# Patient Record
Sex: FEMALE | Race: BLACK OR AFRICAN AMERICAN | NOT HISPANIC OR LATINO
[De-identification: names, ages, dates, MRNs, and addresses within clinical notes are randomized per-mention and may not be internally consistent; named-entity substitution may affect disease eponyms.]

---

## 2021-03-18 ENCOUNTER — NON-APPOINTMENT (OUTPATIENT)
Age: 41
End: 2021-03-18

## 2021-03-18 ENCOUNTER — APPOINTMENT (OUTPATIENT)
Dept: OBGYN | Facility: CLINIC | Age: 41
End: 2021-03-18
Payer: COMMERCIAL

## 2021-03-18 ENCOUNTER — ASOB RESULT (OUTPATIENT)
Age: 41
End: 2021-03-18

## 2021-03-18 VITALS
SYSTOLIC BLOOD PRESSURE: 122 MMHG | HEIGHT: 62 IN | WEIGHT: 160 LBS | BODY MASS INDEX: 29.44 KG/M2 | DIASTOLIC BLOOD PRESSURE: 80 MMHG

## 2021-03-18 LAB
BILIRUB UR QL STRIP: NORMAL
CLARITY UR: CLEAR
COLLECTION METHOD: NORMAL
GLUCOSE UR-MCNC: NORMAL
HCG UR QL: 0.2 EU/DL
HGB UR QL STRIP.AUTO: NORMAL
KETONES UR-MCNC: NORMAL
LEUKOCYTE ESTERASE UR QL STRIP: NORMAL
NITRITE UR QL STRIP: NORMAL
PH UR STRIP: 6.5
PROT UR STRIP-MCNC: NORMAL
SP GR UR STRIP: 1.01

## 2021-03-18 PROCEDURE — 81003 URINALYSIS AUTO W/O SCOPE: CPT | Mod: NC,QW

## 2021-03-18 PROCEDURE — 99072 ADDL SUPL MATRL&STAF TM PHE: CPT

## 2021-03-18 PROCEDURE — 76801 OB US < 14 WKS SINGLE FETUS: CPT

## 2021-03-18 PROCEDURE — 99385 PREV VISIT NEW AGE 18-39: CPT | Mod: 25

## 2021-03-20 LAB
ABO + RH PNL BLD: NORMAL
ALBUMIN SERPL ELPH-MCNC: 4.6 G/DL
ALP BLD-CCNC: 55 U/L
ALT SERPL-CCNC: 13 U/L
ANION GAP SERPL CALC-SCNC: 17 MMOL/L
AST SERPL-CCNC: 19 U/L
BACTERIA UR CULT: NORMAL
BASOPHILS # BLD AUTO: 0.03 K/UL
BASOPHILS NFR BLD AUTO: 0.3 %
BILIRUB SERPL-MCNC: 0.4 MG/DL
BLD GP AB SCN SERPL QL: NORMAL
BUN SERPL-MCNC: 14 MG/DL
C TRACH RRNA SPEC QL NAA+PROBE: NOT DETECTED
CALCIUM SERPL-MCNC: 9.3 MG/DL
CHLORIDE SERPL-SCNC: 100 MMOL/L
CO2 SERPL-SCNC: 19 MMOL/L
CREAT SERPL-MCNC: 0.78 MG/DL
EOSINOPHIL # BLD AUTO: 0.15 K/UL
EOSINOPHIL NFR BLD AUTO: 1.4 %
GLUCOSE SERPL-MCNC: 43 MG/DL
HBV SURFACE AG SER QL: NONREACTIVE
HCT VFR BLD CALC: 41.7 %
HGB A MFR BLD: 97 %
HGB A2 MFR BLD: 3 %
HGB BLD-MCNC: 13.7 G/DL
HGB FRACT BLD-IMP: NORMAL
HIV1+2 AB SPEC QL IA.RAPID: NONREACTIVE
HPV HIGH+LOW RISK DNA PNL CVX: NOT DETECTED
IMM GRANULOCYTES NFR BLD AUTO: 0.3 %
LEAD BLD-MCNC: <1 UG/DL
LYMPHOCYTES # BLD AUTO: 1.8 K/UL
LYMPHOCYTES NFR BLD AUTO: 16.9 %
MAN DIFF?: NORMAL
MCHC RBC-ENTMCNC: 30.5 PG
MCHC RBC-ENTMCNC: 32.9 GM/DL
MCV RBC AUTO: 92.9 FL
MEV IGG FLD QL IA: 257 AU/ML
MEV IGG+IGM SER-IMP: POSITIVE
MONOCYTES # BLD AUTO: 0.75 K/UL
MONOCYTES NFR BLD AUTO: 7 %
N GONORRHOEA RRNA SPEC QL NAA+PROBE: NOT DETECTED
NEUTROPHILS # BLD AUTO: 7.88 K/UL
NEUTROPHILS NFR BLD AUTO: 74.1 %
PLATELET # BLD AUTO: 230 K/UL
POTASSIUM SERPL-SCNC: 4.4 MMOL/L
PROT SERPL-MCNC: 7.2 G/DL
RBC # BLD: 4.49 M/UL
RBC # FLD: 13.8 %
RUBV IGG FLD-ACNC: 2.3 INDEX
RUBV IGG SER-IMP: POSITIVE
SODIUM SERPL-SCNC: 137 MMOL/L
SOURCE TP AMPLIFICATION: NORMAL
T GONDII AB SER-IMP: NEGATIVE
T GONDII AB SER-IMP: NEGATIVE
T GONDII IGG SER QL: <3 IU/ML
T GONDII IGM SER QL: <3 AU/ML
T PALLIDUM AB SER QL IA: NEGATIVE
T4 FREE SERPL-MCNC: 1.3 NG/DL
VZV AB TITR SER: POSITIVE
VZV IGG SER IF-ACNC: 787.7 INDEX
WBC # FLD AUTO: 10.64 K/UL

## 2021-03-22 LAB
B19V IGG SER QL IA: 6.38 INDEX
B19V IGG+IGM SER-IMP: NORMAL
B19V IGG+IGM SER-IMP: POSITIVE
B19V IGM FLD-ACNC: 0.23 INDEX
B19V IGM SER-ACNC: NEGATIVE
CYTOLOGY CVX/VAG DOC THIN PREP: NORMAL

## 2021-03-26 ENCOUNTER — NON-APPOINTMENT (OUTPATIENT)
Age: 41
End: 2021-03-26

## 2021-03-26 ENCOUNTER — APPOINTMENT (OUTPATIENT)
Dept: OBGYN | Facility: CLINIC | Age: 41
End: 2021-03-26
Payer: COMMERCIAL

## 2021-03-26 ENCOUNTER — RESULT CHARGE (OUTPATIENT)
Age: 41
End: 2021-03-26

## 2021-03-26 VITALS — DIASTOLIC BLOOD PRESSURE: 80 MMHG | SYSTOLIC BLOOD PRESSURE: 120 MMHG | WEIGHT: 160 LBS | BODY MASS INDEX: 29.26 KG/M2

## 2021-03-26 DIAGNOSIS — Z3A.10 10 WEEKS GESTATION OF PREGNANCY: ICD-10-CM

## 2021-03-26 LAB
BILIRUB UR QL STRIP: NORMAL
CLARITY UR: CLEAR
COLLECTION METHOD: NORMAL
GLUCOSE UR-MCNC: NORMAL
HCG UR QL: 0.2 EU/DL
HGB UR QL STRIP.AUTO: NORMAL
KETONES UR-MCNC: NORMAL
LEUKOCYTE ESTERASE UR QL STRIP: NORMAL
NITRITE UR QL STRIP: NORMAL
PH UR STRIP: 7
PROT UR STRIP-MCNC: NORMAL
SP GR UR STRIP: 1.01

## 2021-03-26 PROCEDURE — 81003 URINALYSIS AUTO W/O SCOPE: CPT | Mod: NC,QW

## 2021-03-26 PROCEDURE — 36415 COLL VENOUS BLD VENIPUNCTURE: CPT

## 2021-03-26 PROCEDURE — 99072 ADDL SUPL MATRL&STAF TM PHE: CPT

## 2021-03-26 PROCEDURE — 0502F SUBSEQUENT PRENATAL CARE: CPT

## 2021-04-05 ENCOUNTER — NON-APPOINTMENT (OUTPATIENT)
Age: 41
End: 2021-04-05

## 2021-04-08 ENCOUNTER — OUTPATIENT (OUTPATIENT)
Dept: OUTPATIENT SERVICES | Facility: HOSPITAL | Age: 41
LOS: 1 days | End: 2021-04-08
Payer: COMMERCIAL

## 2021-04-08 ENCOUNTER — APPOINTMENT (OUTPATIENT)
Dept: MAMMOGRAPHY | Facility: IMAGING CENTER | Age: 41
End: 2021-04-08
Payer: COMMERCIAL

## 2021-04-08 ENCOUNTER — APPOINTMENT (OUTPATIENT)
Dept: ULTRASOUND IMAGING | Facility: IMAGING CENTER | Age: 41
End: 2021-04-08
Payer: COMMERCIAL

## 2021-04-08 DIAGNOSIS — Z00.8 ENCOUNTER FOR OTHER GENERAL EXAMINATION: ICD-10-CM

## 2021-04-08 PROCEDURE — 76641 ULTRASOUND BREAST COMPLETE: CPT | Mod: 26,50

## 2021-04-08 PROCEDURE — 77066 DX MAMMO INCL CAD BI: CPT | Mod: 26

## 2021-04-08 PROCEDURE — G0279: CPT

## 2021-04-08 PROCEDURE — 76641 ULTRASOUND BREAST COMPLETE: CPT

## 2021-04-08 PROCEDURE — 77066 DX MAMMO INCL CAD BI: CPT

## 2021-04-08 PROCEDURE — G0279: CPT | Mod: 26

## 2021-04-09 ENCOUNTER — APPOINTMENT (OUTPATIENT)
Dept: OBGYN | Facility: CLINIC | Age: 41
End: 2021-04-09
Payer: COMMERCIAL

## 2021-04-09 ENCOUNTER — NON-APPOINTMENT (OUTPATIENT)
Age: 41
End: 2021-04-09

## 2021-04-09 ENCOUNTER — ASOB RESULT (OUTPATIENT)
Age: 41
End: 2021-04-09

## 2021-04-09 VITALS
SYSTOLIC BLOOD PRESSURE: 110 MMHG | WEIGHT: 161 LBS | DIASTOLIC BLOOD PRESSURE: 84 MMHG | BODY MASS INDEX: 29.63 KG/M2 | HEIGHT: 62 IN

## 2021-04-09 DIAGNOSIS — Z3A.12 12 WEEKS GESTATION OF PREGNANCY: ICD-10-CM

## 2021-04-09 LAB
BILIRUB UR QL STRIP: NORMAL
CLARITY UR: CLEAR
COLLECTION METHOD: NORMAL
GLUCOSE UR-MCNC: NORMAL
HCG UR QL: 0.2 EU/DL
HGB UR QL STRIP.AUTO: NORMAL
KETONES UR-MCNC: NORMAL
LEUKOCYTE ESTERASE UR QL STRIP: NORMAL
NITRITE UR QL STRIP: NORMAL
PH UR STRIP: 6
PROT UR STRIP-MCNC: NORMAL
SP GR UR STRIP: 1.01

## 2021-04-09 PROCEDURE — 81003 URINALYSIS AUTO W/O SCOPE: CPT | Mod: NC,QW

## 2021-04-09 PROCEDURE — 0502F SUBSEQUENT PRENATAL CARE: CPT

## 2021-04-09 PROCEDURE — 76813 OB US NUCHAL MEAS 1 GEST: CPT

## 2021-05-10 ENCOUNTER — NON-APPOINTMENT (OUTPATIENT)
Age: 41
End: 2021-05-10

## 2021-05-10 ENCOUNTER — LABORATORY RESULT (OUTPATIENT)
Age: 41
End: 2021-05-10

## 2021-05-10 ENCOUNTER — APPOINTMENT (OUTPATIENT)
Dept: OBGYN | Facility: CLINIC | Age: 41
End: 2021-05-10
Payer: COMMERCIAL

## 2021-05-10 ENCOUNTER — APPOINTMENT (OUTPATIENT)
Dept: ANTEPARTUM | Facility: CLINIC | Age: 41
End: 2021-05-10
Payer: COMMERCIAL

## 2021-05-10 ENCOUNTER — ASOB RESULT (OUTPATIENT)
Age: 41
End: 2021-05-10

## 2021-05-10 ENCOUNTER — APPOINTMENT (OUTPATIENT)
Dept: ANTEPARTUM | Facility: CLINIC | Age: 41
End: 2021-05-10

## 2021-05-10 VITALS
HEIGHT: 62 IN | BODY MASS INDEX: 30.73 KG/M2 | WEIGHT: 167 LBS | SYSTOLIC BLOOD PRESSURE: 124 MMHG | DIASTOLIC BLOOD PRESSURE: 84 MMHG

## 2021-05-10 DIAGNOSIS — O35.9XX0 MATERNAL CARE FOR (SUSPECTED) FETAL ABNORMALITY AND DAMAGE, UNSPECIFIED, NOT APPLICABLE OR UNSPECIFIED: ICD-10-CM

## 2021-05-10 LAB
BILIRUB UR QL STRIP: NORMAL
CLARITY UR: CLEAR
COLLECTION METHOD: NORMAL
GLUCOSE UR-MCNC: NORMAL
HCG UR QL: 0.2 EU/DL
HGB UR QL STRIP.AUTO: NORMAL
KETONES UR-MCNC: NORMAL
LEUKOCYTE ESTERASE UR QL STRIP: NORMAL
NITRITE UR QL STRIP: NORMAL
PH UR STRIP: 5.5
PROT UR STRIP-MCNC: NORMAL
SP GR UR STRIP: 1.01
T4 FREE SERPL-MCNC: 1.2 NG/DL
TSH SERPL-ACNC: 2.27 UIU/ML
TSH SERPL-ACNC: 3.15 UIU/ML

## 2021-05-10 PROCEDURE — 99203 OFFICE O/P NEW LOW 30 MIN: CPT | Mod: 25

## 2021-05-10 PROCEDURE — 81003 URINALYSIS AUTO W/O SCOPE: CPT | Mod: NC,QW

## 2021-05-10 PROCEDURE — 99072 ADDL SUPL MATRL&STAF TM PHE: CPT

## 2021-05-10 PROCEDURE — 76815 OB US LIMITED FETUS(S): CPT

## 2021-05-10 PROCEDURE — 0502F SUBSEQUENT PRENATAL CARE: CPT

## 2021-05-10 PROCEDURE — 59000 AMNIOCENTESIS DIAGNOSTIC: CPT

## 2021-05-10 PROCEDURE — 36415 COLL VENOUS BLD VENIPUNCTURE: CPT

## 2021-05-10 PROCEDURE — 76946 ECHO GUIDE FOR AMNIOCENTESIS: CPT | Mod: 26

## 2021-05-11 ENCOUNTER — LABORATORY RESULT (OUTPATIENT)
Age: 41
End: 2021-05-11

## 2021-05-11 PROBLEM — O35.9XX0 FETAL ABNORMALITY IN PREGNANCY: Status: ACTIVE | Noted: 2021-05-11

## 2021-05-13 LAB — URINE CYTOLOGY: NORMAL

## 2021-05-14 ENCOUNTER — APPOINTMENT (OUTPATIENT)
Dept: PEDIATRIC CARDIOLOGY | Facility: CLINIC | Age: 41
End: 2021-05-14
Payer: COMMERCIAL

## 2021-05-14 LAB — BACTERIA UR CULT: NORMAL

## 2021-05-14 PROCEDURE — 93325 DOPPLER ECHO COLOR FLOW MAPG: CPT | Mod: 59

## 2021-05-14 PROCEDURE — 99072 ADDL SUPL MATRL&STAF TM PHE: CPT

## 2021-05-14 PROCEDURE — 76820 UMBILICAL ARTERY ECHO: CPT

## 2021-05-14 PROCEDURE — 76827 ECHO EXAM OF FETAL HEART: CPT

## 2021-05-14 PROCEDURE — 99203 OFFICE O/P NEW LOW 30 MIN: CPT | Mod: 25

## 2021-05-14 PROCEDURE — 76825 ECHO EXAM OF FETAL HEART: CPT

## 2021-05-17 ENCOUNTER — TRANSCRIPTION ENCOUNTER (OUTPATIENT)
Age: 41
End: 2021-05-17

## 2021-05-17 LAB
1ST TRIMESTER DATA: NORMAL
1ST TRIMESTER DATA: NORMAL
2ND TRIMESTER DATA: NORMAL
ADDENDUM DOC: NORMAL
AFP PNL SERPL: NORMAL
AFP PNL SERPL: NORMAL
AFP SERPL-ACNC: NORMAL
B-HCG FREE SERPL-MCNC: NORMAL
CLINICAL BIOCHEMIST REVIEW: NORMAL
FREE BETA HCG 1ST TRIMESTER: NORMAL
FREE BETA HCG 1ST TRIMESTER: NORMAL
INHIBIN A SERPL-MCNC: NORMAL
Lab: NORMAL
NASAL BONE: PRESENT
NASAL BONE: PRESENT
NOTES NTD: NORMAL
NOTES NTD: NORMAL
NT: NORMAL
NT: NORMAL
PAPP-A SERPL-ACNC: NORMAL
PAPP-A SERPL-ACNC: NORMAL
TRISOMY 18/3: NORMAL
U ESTRIOL SERPL-SCNC: NORMAL

## 2021-05-19 ENCOUNTER — NON-APPOINTMENT (OUTPATIENT)
Age: 41
End: 2021-05-19

## 2021-05-21 ENCOUNTER — APPOINTMENT (OUTPATIENT)
Dept: PEDIATRIC CARDIOLOGY | Facility: CLINIC | Age: 41
End: 2021-05-21
Payer: COMMERCIAL

## 2021-05-21 PROCEDURE — 99072 ADDL SUPL MATRL&STAF TM PHE: CPT

## 2021-05-21 PROCEDURE — 93325 DOPPLER ECHO COLOR FLOW MAPG: CPT

## 2021-05-21 PROCEDURE — 99203 OFFICE O/P NEW LOW 30 MIN: CPT

## 2021-05-21 PROCEDURE — 76825 ECHO EXAM OF FETAL HEART: CPT

## 2021-05-21 PROCEDURE — 76827 ECHO EXAM OF FETAL HEART: CPT

## 2021-05-24 ENCOUNTER — APPOINTMENT (OUTPATIENT)
Dept: ANTEPARTUM | Facility: CLINIC | Age: 41
End: 2021-05-24

## 2021-05-27 ENCOUNTER — APPOINTMENT (OUTPATIENT)
Dept: ANTEPARTUM | Facility: CLINIC | Age: 41
End: 2021-05-27
Payer: COMMERCIAL

## 2021-05-27 ENCOUNTER — OUTPATIENT (OUTPATIENT)
Dept: OUTPATIENT SERVICES | Facility: HOSPITAL | Age: 41
LOS: 1 days | End: 2021-05-27
Payer: COMMERCIAL

## 2021-05-27 ENCOUNTER — ASOB RESULT (OUTPATIENT)
Age: 41
End: 2021-05-27

## 2021-05-27 DIAGNOSIS — O35.8XX0 MATERNAL CARE FOR OTHER (SUSPECTED) FETAL ABNORMALITY AND DAMAGE, NOT APPLICABLE OR UNSPECIFIED: ICD-10-CM

## 2021-05-27 DIAGNOSIS — O99.282 ENDOCRINE, NUTRITIONAL AND METABOLIC DISEASES COMPLICATING PREGNANCY, SECOND TRIMESTER: ICD-10-CM

## 2021-05-27 DIAGNOSIS — O09.521 SUPERVISION OF ELDERLY MULTIGRAVIDA, FIRST TRIMESTER: ICD-10-CM

## 2021-05-27 DIAGNOSIS — O09.812 SUPERVISION OF PREGNANCY RESULTING FROM ASSISTED REPRODUCTIVE TECHNOLOGY, SECOND TRIMESTER: ICD-10-CM

## 2021-05-27 DIAGNOSIS — O34.12 MATERNAL CARE FOR BENIGN TUMOR OF CORPUS UTERI, SECOND TRIMESTER: ICD-10-CM

## 2021-05-27 DIAGNOSIS — Z01.419 ENCOUNTER FOR GYNECOLOGICAL EXAMINATION (GENERAL) (ROUTINE) WITHOUT ABNORMAL FINDINGS: ICD-10-CM

## 2021-05-27 PROCEDURE — 99072 ADDL SUPL MATRL&STAF TM PHE: CPT

## 2021-05-27 PROCEDURE — 76811 OB US DETAILED SNGL FETUS: CPT

## 2021-05-27 PROCEDURE — 99214 OFFICE O/P EST MOD 30 MIN: CPT | Mod: 25

## 2021-05-27 PROCEDURE — 76946 ECHO GUIDE FOR AMNIOCENTESIS: CPT

## 2021-05-27 PROCEDURE — 88285 CHROMOSOME COUNT ADDITIONAL: CPT

## 2021-05-27 PROCEDURE — 88269 CHROMOSOME ANALYS AMNIOTIC: CPT

## 2021-05-27 PROCEDURE — 88271 CYTOGENETICS DNA PROBE: CPT

## 2021-05-27 PROCEDURE — 59000 AMNIOCENTESIS DIAGNOSTIC: CPT

## 2021-05-27 PROCEDURE — 88280 CHROMOSOME KARYOTYPE STUDY: CPT

## 2021-05-27 PROCEDURE — 88275 CYTOGENETICS 100-300: CPT

## 2021-05-27 PROCEDURE — 88235 TISSUE CULTURE PLACENTA: CPT

## 2021-05-27 PROCEDURE — 76946 ECHO GUIDE FOR AMNIOCENTESIS: CPT | Mod: 26

## 2021-05-28 LAB — SUBTELOMERE ANALYSIS BLD/T FISH-IMP: SIGNIFICANT CHANGE UP

## 2021-06-02 ENCOUNTER — NON-APPOINTMENT (OUTPATIENT)
Age: 41
End: 2021-06-02

## 2021-06-02 ENCOUNTER — APPOINTMENT (OUTPATIENT)
Dept: OBGYN | Facility: CLINIC | Age: 41
End: 2021-06-02
Payer: COMMERCIAL

## 2021-06-02 VITALS
DIASTOLIC BLOOD PRESSURE: 80 MMHG | WEIGHT: 165 LBS | HEIGHT: 62 IN | BODY MASS INDEX: 30.36 KG/M2 | SYSTOLIC BLOOD PRESSURE: 120 MMHG

## 2021-06-02 DIAGNOSIS — Z3A.20 20 WEEKS GESTATION OF PREGNANCY: ICD-10-CM

## 2021-06-02 LAB
BILIRUB UR QL STRIP: NORMAL
CLARITY UR: CLEAR
COLLECTION METHOD: NORMAL
GLUCOSE UR-MCNC: NORMAL
HCG UR QL: 0.2 EU/DL
HGB UR QL STRIP.AUTO: NORMAL
KETONES UR-MCNC: NORMAL
LEUKOCYTE ESTERASE UR QL STRIP: NORMAL
NITRITE UR QL STRIP: NORMAL
PH UR STRIP: 7
PROT UR STRIP-MCNC: NORMAL
SP GR UR STRIP: 1.02

## 2021-06-02 PROCEDURE — 81003 URINALYSIS AUTO W/O SCOPE: CPT | Mod: NC,QW

## 2021-06-02 PROCEDURE — 0502F SUBSEQUENT PRENATAL CARE: CPT

## 2021-06-03 PROBLEM — Z3A.20 20 WEEKS GESTATION OF PREGNANCY: Status: ACTIVE | Noted: 2021-06-03

## 2021-06-04 ENCOUNTER — APPOINTMENT (OUTPATIENT)
Dept: PEDIATRIC CARDIOLOGY | Facility: CLINIC | Age: 41
End: 2021-06-04
Payer: COMMERCIAL

## 2021-06-04 LAB
CHROM ANALY OVERALL INTERP SPEC-IMP: SIGNIFICANT CHANGE UP
NYS PRENATAL DIAGNOSIS FOLLOW-UP QUESTIONNAIRE: SIGNIFICANT CHANGE UP

## 2021-06-04 PROCEDURE — 99072 ADDL SUPL MATRL&STAF TM PHE: CPT

## 2021-06-04 PROCEDURE — 76820 UMBILICAL ARTERY ECHO: CPT

## 2021-06-04 PROCEDURE — 76826 ECHO EXAM OF FETAL HEART: CPT

## 2021-06-04 PROCEDURE — 99214 OFFICE O/P EST MOD 30 MIN: CPT | Mod: 25

## 2021-06-04 PROCEDURE — 76828 ECHO EXAM OF FETAL HEART: CPT

## 2021-06-04 PROCEDURE — 93325 DOPPLER ECHO COLOR FLOW MAPG: CPT | Mod: 59

## 2021-06-11 ENCOUNTER — ASOB RESULT (OUTPATIENT)
Age: 41
End: 2021-06-11

## 2021-06-11 ENCOUNTER — APPOINTMENT (OUTPATIENT)
Dept: ANTEPARTUM | Facility: CLINIC | Age: 41
End: 2021-06-11
Payer: COMMERCIAL

## 2021-06-11 PROCEDURE — 76820 UMBILICAL ARTERY ECHO: CPT

## 2021-06-11 PROCEDURE — 76816 OB US FOLLOW-UP PER FETUS: CPT

## 2021-06-11 PROCEDURE — 99212 OFFICE O/P EST SF 10 MIN: CPT | Mod: 25

## 2021-06-11 PROCEDURE — 99072 ADDL SUPL MATRL&STAF TM PHE: CPT

## 2021-06-18 ENCOUNTER — APPOINTMENT (OUTPATIENT)
Dept: PEDIATRIC CARDIOLOGY | Facility: CLINIC | Age: 41
End: 2021-06-18
Payer: COMMERCIAL

## 2021-06-18 PROCEDURE — 76825 ECHO EXAM OF FETAL HEART: CPT

## 2021-06-18 PROCEDURE — 99072 ADDL SUPL MATRL&STAF TM PHE: CPT

## 2021-06-18 PROCEDURE — 76820 UMBILICAL ARTERY ECHO: CPT

## 2021-06-18 PROCEDURE — 99214 OFFICE O/P EST MOD 30 MIN: CPT

## 2021-06-18 PROCEDURE — 76827 ECHO EXAM OF FETAL HEART: CPT

## 2021-06-18 PROCEDURE — 93325 DOPPLER ECHO COLOR FLOW MAPG: CPT | Mod: 59

## 2021-06-22 ENCOUNTER — NON-APPOINTMENT (OUTPATIENT)
Age: 41
End: 2021-06-22

## 2021-06-24 ENCOUNTER — APPOINTMENT (OUTPATIENT)
Dept: ANTEPARTUM | Facility: CLINIC | Age: 41
End: 2021-06-24
Payer: COMMERCIAL

## 2021-06-24 ENCOUNTER — ASOB RESULT (OUTPATIENT)
Age: 41
End: 2021-06-24

## 2021-06-24 PROCEDURE — 99213 OFFICE O/P EST LOW 20 MIN: CPT | Mod: 25

## 2021-06-24 PROCEDURE — 76816 OB US FOLLOW-UP PER FETUS: CPT

## 2021-06-24 PROCEDURE — 99072 ADDL SUPL MATRL&STAF TM PHE: CPT

## 2021-06-25 ENCOUNTER — NON-APPOINTMENT (OUTPATIENT)
Age: 41
End: 2021-06-25

## 2021-06-30 ENCOUNTER — NON-APPOINTMENT (OUTPATIENT)
Age: 41
End: 2021-06-30

## 2021-06-30 ENCOUNTER — APPOINTMENT (OUTPATIENT)
Dept: OBGYN | Facility: CLINIC | Age: 41
End: 2021-06-30
Payer: COMMERCIAL

## 2021-06-30 VITALS
DIASTOLIC BLOOD PRESSURE: 78 MMHG | HEIGHT: 62 IN | WEIGHT: 173 LBS | SYSTOLIC BLOOD PRESSURE: 116 MMHG | BODY MASS INDEX: 31.83 KG/M2

## 2021-06-30 DIAGNOSIS — Z3A.24 24 WEEKS GESTATION OF PREGNANCY: ICD-10-CM

## 2021-06-30 LAB
T4 FREE SERPL-MCNC: 1.1 NG/DL
TSH SERPL-ACNC: 0.91 UIU/ML

## 2021-06-30 PROCEDURE — 0502F SUBSEQUENT PRENATAL CARE: CPT

## 2021-07-02 ENCOUNTER — NON-APPOINTMENT (OUTPATIENT)
Age: 41
End: 2021-07-02

## 2021-07-06 ENCOUNTER — APPOINTMENT (OUTPATIENT)
Dept: PEDIATRIC CARDIOLOGY | Facility: CLINIC | Age: 41
End: 2021-07-06
Payer: COMMERCIAL

## 2021-07-06 PROCEDURE — 76820 UMBILICAL ARTERY ECHO: CPT

## 2021-07-06 PROCEDURE — 76828 ECHO EXAM OF FETAL HEART: CPT

## 2021-07-06 PROCEDURE — 93325 DOPPLER ECHO COLOR FLOW MAPG: CPT | Mod: 59

## 2021-07-06 PROCEDURE — 99213 OFFICE O/P EST LOW 20 MIN: CPT | Mod: 25

## 2021-07-06 PROCEDURE — 99072 ADDL SUPL MATRL&STAF TM PHE: CPT

## 2021-07-06 PROCEDURE — 76826 ECHO EXAM OF FETAL HEART: CPT

## 2021-07-08 ENCOUNTER — NON-APPOINTMENT (OUTPATIENT)
Age: 41
End: 2021-07-08

## 2021-07-16 ENCOUNTER — APPOINTMENT (OUTPATIENT)
Dept: PEDIATRIC CARDIOLOGY | Facility: CLINIC | Age: 41
End: 2021-07-16
Payer: COMMERCIAL

## 2021-07-16 PROCEDURE — 76828 ECHO EXAM OF FETAL HEART: CPT

## 2021-07-16 PROCEDURE — 93325 DOPPLER ECHO COLOR FLOW MAPG: CPT | Mod: 59

## 2021-07-16 PROCEDURE — 99212 OFFICE O/P EST SF 10 MIN: CPT

## 2021-07-16 PROCEDURE — 76826 ECHO EXAM OF FETAL HEART: CPT

## 2021-07-16 PROCEDURE — 99072 ADDL SUPL MATRL&STAF TM PHE: CPT

## 2021-07-16 PROCEDURE — 76820 UMBILICAL ARTERY ECHO: CPT

## 2021-07-20 ENCOUNTER — NON-APPOINTMENT (OUTPATIENT)
Age: 41
End: 2021-07-20

## 2021-07-20 ENCOUNTER — APPOINTMENT (OUTPATIENT)
Dept: ANTEPARTUM | Facility: CLINIC | Age: 41
End: 2021-07-20
Payer: COMMERCIAL

## 2021-07-20 ENCOUNTER — ASOB RESULT (OUTPATIENT)
Age: 41
End: 2021-07-20

## 2021-07-20 PROCEDURE — 99072 ADDL SUPL MATRL&STAF TM PHE: CPT

## 2021-07-20 PROCEDURE — ZZZZZ: CPT

## 2021-07-20 PROCEDURE — 76816 OB US FOLLOW-UP PER FETUS: CPT

## 2021-07-21 ENCOUNTER — APPOINTMENT (OUTPATIENT)
Dept: ANTEPARTUM | Facility: CLINIC | Age: 41
End: 2021-07-21

## 2021-07-23 ENCOUNTER — NON-APPOINTMENT (OUTPATIENT)
Age: 41
End: 2021-07-23

## 2021-07-27 ENCOUNTER — APPOINTMENT (OUTPATIENT)
Dept: PEDIATRIC CARDIOLOGY | Facility: CLINIC | Age: 41
End: 2021-07-27
Payer: COMMERCIAL

## 2021-07-27 ENCOUNTER — ASOB RESULT (OUTPATIENT)
Age: 41
End: 2021-07-27

## 2021-07-27 ENCOUNTER — APPOINTMENT (OUTPATIENT)
Dept: ANTEPARTUM | Facility: CLINIC | Age: 41
End: 2021-07-27
Payer: COMMERCIAL

## 2021-07-27 PROCEDURE — 93325 DOPPLER ECHO COLOR FLOW MAPG: CPT | Mod: 59

## 2021-07-27 PROCEDURE — 76820 UMBILICAL ARTERY ECHO: CPT

## 2021-07-27 PROCEDURE — 99213 OFFICE O/P EST LOW 20 MIN: CPT | Mod: 25

## 2021-07-27 PROCEDURE — 76828 ECHO EXAM OF FETAL HEART: CPT

## 2021-07-27 PROCEDURE — 99072 ADDL SUPL MATRL&STAF TM PHE: CPT

## 2021-07-27 PROCEDURE — 76826 ECHO EXAM OF FETAL HEART: CPT

## 2021-07-27 PROCEDURE — 76818 FETAL BIOPHYS PROFILE W/NST: CPT

## 2021-07-29 ENCOUNTER — APPOINTMENT (OUTPATIENT)
Dept: OBGYN | Facility: CLINIC | Age: 41
End: 2021-07-29
Payer: COMMERCIAL

## 2021-07-29 DIAGNOSIS — Z00.00 ENCOUNTER FOR GENERAL ADULT MEDICAL EXAMINATION W/OUT ABNORMAL FINDINGS: ICD-10-CM

## 2021-07-29 DIAGNOSIS — Z23 ENCOUNTER FOR IMMUNIZATION: ICD-10-CM

## 2021-07-29 DIAGNOSIS — O99.280 ENDOCRINE, NUTRITIONAL AND METABOLIC DISEASES COMPLICATING PREGNANCY, UNSPECIFIED TRIMESTER: ICD-10-CM

## 2021-07-29 DIAGNOSIS — E03.9 ENDOCRINE, NUTRITIONAL AND METABOLIC DISEASES COMPLICATING PREGNANCY, UNSPECIFIED TRIMESTER: ICD-10-CM

## 2021-07-29 DIAGNOSIS — Q61.2 POLYCYSTIC KIDNEY, ADULT TYPE: ICD-10-CM

## 2021-07-29 DIAGNOSIS — O36.5990 MATERNAL CARE FOR OTHER KNOWN OR SUSPECTED POOR FETAL GROWTH, UNSPECIFIED TRIMESTER, NOT APPLICABLE OR UNSPECIFIED: ICD-10-CM

## 2021-07-29 DIAGNOSIS — Z3A.28 28 WEEKS GESTATION OF PREGNANCY: ICD-10-CM

## 2021-07-29 DIAGNOSIS — O36.8990 MATERNAL CARE FOR OTHER SPECIFIED FETAL PROBLEMS, UNSPECIFIED TRIMESTER, NOT APPLICABLE OR UNSPECIFIED: ICD-10-CM

## 2021-07-29 DIAGNOSIS — O09.519 SUPERVISION OF ELDERLY PRIMIGRAVIDA, UNSPECIFIED TRIMESTER: ICD-10-CM

## 2021-07-29 LAB
BASOPHILS # BLD AUTO: 0.02 K/UL
BASOPHILS NFR BLD AUTO: 0.2 %
EOSINOPHIL # BLD AUTO: 0.08 K/UL
EOSINOPHIL NFR BLD AUTO: 0.8 %
HCT VFR BLD CALC: 36.6 %
HGB BLD-MCNC: 12.6 G/DL
IMM GRANULOCYTES NFR BLD AUTO: 0.8 %
LYMPHOCYTES # BLD AUTO: 1.14 K/UL
LYMPHOCYTES NFR BLD AUTO: 11.3 %
MAN DIFF?: NORMAL
MCHC RBC-ENTMCNC: 31.8 PG
MCHC RBC-ENTMCNC: 34.4 GM/DL
MCV RBC AUTO: 92.4 FL
MONOCYTES # BLD AUTO: 0.85 K/UL
MONOCYTES NFR BLD AUTO: 8.4 %
NEUTROPHILS # BLD AUTO: 7.94 K/UL
NEUTROPHILS NFR BLD AUTO: 78.5 %
PLATELET # BLD AUTO: 175 K/UL
RBC # BLD: 3.96 M/UL
RBC # FLD: 13.2 %
WBC # FLD AUTO: 10.11 K/UL

## 2021-07-29 PROCEDURE — 0502F SUBSEQUENT PRENATAL CARE: CPT

## 2021-07-31 LAB
BLD GP AB SCN SERPL QL: NORMAL
GLUCOSE 1H P 50 G GLC PO SERPL-MCNC: 95 MG/DL
HIV1+2 AB SPEC QL IA.RAPID: NONREACTIVE

## 2021-08-03 ENCOUNTER — NON-APPOINTMENT (OUTPATIENT)
Age: 41
End: 2021-08-03

## 2021-08-05 ENCOUNTER — APPOINTMENT (OUTPATIENT)
Dept: ANTEPARTUM | Facility: CLINIC | Age: 41
End: 2021-08-05
Payer: COMMERCIAL

## 2021-08-05 ENCOUNTER — ASOB RESULT (OUTPATIENT)
Age: 41
End: 2021-08-05

## 2021-08-05 PROCEDURE — 76818 FETAL BIOPHYS PROFILE W/NST: CPT

## 2021-08-05 PROCEDURE — 76820 UMBILICAL ARTERY ECHO: CPT

## 2021-08-10 ENCOUNTER — APPOINTMENT (OUTPATIENT)
Dept: ANTEPARTUM | Facility: CLINIC | Age: 41
End: 2021-08-10
Payer: COMMERCIAL

## 2021-08-10 ENCOUNTER — ASOB RESULT (OUTPATIENT)
Age: 41
End: 2021-08-10

## 2021-08-10 PROCEDURE — 76816 OB US FOLLOW-UP PER FETUS: CPT

## 2021-08-10 PROCEDURE — 76820 UMBILICAL ARTERY ECHO: CPT

## 2021-08-13 ENCOUNTER — APPOINTMENT (OUTPATIENT)
Dept: PEDIATRIC CARDIOLOGY | Facility: CLINIC | Age: 41
End: 2021-08-13
Payer: COMMERCIAL

## 2021-08-13 PROCEDURE — 99214 OFFICE O/P EST MOD 30 MIN: CPT | Mod: 25

## 2021-08-13 PROCEDURE — 93325 DOPPLER ECHO COLOR FLOW MAPG: CPT | Mod: 59

## 2021-08-13 PROCEDURE — 76828 ECHO EXAM OF FETAL HEART: CPT

## 2021-08-13 PROCEDURE — 76826 ECHO EXAM OF FETAL HEART: CPT

## 2021-08-13 PROCEDURE — 76820 UMBILICAL ARTERY ECHO: CPT

## 2021-08-17 ENCOUNTER — ASOB RESULT (OUTPATIENT)
Age: 41
End: 2021-08-17

## 2021-08-17 ENCOUNTER — APPOINTMENT (OUTPATIENT)
Dept: ANTEPARTUM | Facility: CLINIC | Age: 41
End: 2021-08-17
Payer: COMMERCIAL

## 2021-08-17 ENCOUNTER — APPOINTMENT (OUTPATIENT)
Dept: ANTEPARTUM | Facility: CLINIC | Age: 41
End: 2021-08-17

## 2021-08-17 PROCEDURE — 76818 FETAL BIOPHYS PROFILE W/NST: CPT

## 2021-08-17 PROCEDURE — 76820 UMBILICAL ARTERY ECHO: CPT

## 2021-08-24 ENCOUNTER — APPOINTMENT (OUTPATIENT)
Dept: PEDIATRIC CARDIOLOGY | Facility: CLINIC | Age: 41
End: 2021-08-24

## 2021-08-24 ENCOUNTER — APPOINTMENT (OUTPATIENT)
Dept: ANTEPARTUM | Facility: CLINIC | Age: 41
End: 2021-08-24

## 2021-08-24 ENCOUNTER — ASOB RESULT (OUTPATIENT)
Age: 41
End: 2021-08-24

## 2021-08-24 ENCOUNTER — APPOINTMENT (OUTPATIENT)
Dept: ANTEPARTUM | Facility: CLINIC | Age: 41
End: 2021-08-24
Payer: COMMERCIAL

## 2021-08-24 PROCEDURE — 76819 FETAL BIOPHYS PROFIL W/O NST: CPT

## 2021-08-24 PROCEDURE — 76820 UMBILICAL ARTERY ECHO: CPT

## 2021-08-26 ENCOUNTER — APPOINTMENT (OUTPATIENT)
Dept: OBGYN | Facility: CLINIC | Age: 41
End: 2021-08-26
Payer: COMMERCIAL

## 2021-08-26 VITALS
SYSTOLIC BLOOD PRESSURE: 140 MMHG | WEIGHT: 188 LBS | HEIGHT: 62 IN | DIASTOLIC BLOOD PRESSURE: 100 MMHG | BODY MASS INDEX: 34.6 KG/M2

## 2021-08-26 DIAGNOSIS — Z3A.32 32 WEEKS GESTATION OF PREGNANCY: ICD-10-CM

## 2021-08-26 DIAGNOSIS — O35.9XX0 MATERNAL CARE FOR (SUSPECTED) FETAL ABNORMALITY AND DAMAGE, UNSPECIFIED, NOT APPLICABLE OR UNSPECIFIED: ICD-10-CM

## 2021-08-26 DIAGNOSIS — Z98.890 OTHER SPECIFIED POSTPROCEDURAL STATES: ICD-10-CM

## 2021-08-26 PROCEDURE — 0502F SUBSEQUENT PRENATAL CARE: CPT

## 2021-08-26 PROCEDURE — 36415 COLL VENOUS BLD VENIPUNCTURE: CPT

## 2021-08-27 ENCOUNTER — NON-APPOINTMENT (OUTPATIENT)
Age: 41
End: 2021-08-27

## 2021-08-27 LAB
ALBUMIN SERPL ELPH-MCNC: 3.5 G/DL
ALP BLD-CCNC: 101 U/L
ALT SERPL-CCNC: 16 U/L
ANION GAP SERPL CALC-SCNC: 15 MMOL/L
AST SERPL-CCNC: 17 U/L
BASOPHILS # BLD AUTO: 0.02 K/UL
BASOPHILS NFR BLD AUTO: 0.2 %
BILIRUB SERPL-MCNC: 0.2 MG/DL
BUN SERPL-MCNC: 19 MG/DL
CALCIUM SERPL-MCNC: 8.9 MG/DL
CHLORIDE SERPL-SCNC: 101 MMOL/L
CO2 SERPL-SCNC: 19 MMOL/L
CREAT SERPL-MCNC: 0.85 MG/DL
EOSINOPHIL # BLD AUTO: 0.11 K/UL
EOSINOPHIL NFR BLD AUTO: 1.2 %
GLUCOSE SERPL-MCNC: 71 MG/DL
HCT VFR BLD CALC: 36.8 %
HGB BLD-MCNC: 12.3 G/DL
IMM GRANULOCYTES NFR BLD AUTO: 0.8 %
LYMPHOCYTES # BLD AUTO: 1.47 K/UL
LYMPHOCYTES NFR BLD AUTO: 15.5 %
MAN DIFF?: NORMAL
MCHC RBC-ENTMCNC: 31.3 PG
MCHC RBC-ENTMCNC: 33.4 GM/DL
MCV RBC AUTO: 93.6 FL
MONOCYTES # BLD AUTO: 0.89 K/UL
MONOCYTES NFR BLD AUTO: 9.4 %
NEUTROPHILS # BLD AUTO: 6.91 K/UL
NEUTROPHILS NFR BLD AUTO: 72.9 %
PLATELET # BLD AUTO: 158 K/UL
POTASSIUM SERPL-SCNC: 4.3 MMOL/L
PROT SERPL-MCNC: 5.6 G/DL
RBC # BLD: 3.93 M/UL
RBC # FLD: 13.2 %
SODIUM SERPL-SCNC: 135 MMOL/L
URATE SERPL-MCNC: 5.1 MG/DL
WBC # FLD AUTO: 9.48 K/UL

## 2021-08-30 ENCOUNTER — NON-APPOINTMENT (OUTPATIENT)
Age: 41
End: 2021-08-30

## 2021-09-01 ENCOUNTER — ASOB RESULT (OUTPATIENT)
Age: 41
End: 2021-09-01

## 2021-09-01 ENCOUNTER — INPATIENT (INPATIENT)
Facility: HOSPITAL | Age: 41
LOS: 6 days | Discharge: HOME | End: 2021-09-08
Attending: SPECIALIST | Admitting: SPECIALIST
Payer: COMMERCIAL

## 2021-09-01 ENCOUNTER — NON-APPOINTMENT (OUTPATIENT)
Age: 41
End: 2021-09-01

## 2021-09-01 ENCOUNTER — APPOINTMENT (OUTPATIENT)
Dept: ANTEPARTUM | Facility: CLINIC | Age: 41
End: 2021-09-01

## 2021-09-01 ENCOUNTER — APPOINTMENT (OUTPATIENT)
Dept: ANTEPARTUM | Facility: CLINIC | Age: 41
End: 2021-09-01
Payer: COMMERCIAL

## 2021-09-01 VITALS
RESPIRATION RATE: 17 BRPM | SYSTOLIC BLOOD PRESSURE: 154 MMHG | TEMPERATURE: 99 F | HEART RATE: 67 BPM | DIASTOLIC BLOOD PRESSURE: 96 MMHG

## 2021-09-01 DIAGNOSIS — Z3A.00 WEEKS OF GESTATION OF PREGNANCY NOT SPECIFIED: ICD-10-CM

## 2021-09-01 DIAGNOSIS — O16.3 UNSPECIFIED MATERNAL HYPERTENSION, THIRD TRIMESTER: ICD-10-CM

## 2021-09-01 DIAGNOSIS — Z98.890 OTHER SPECIFIED POSTPROCEDURAL STATES: Chronic | ICD-10-CM

## 2021-09-01 DIAGNOSIS — O26.899 OTHER SPECIFIED PREGNANCY RELATED CONDITIONS, UNSPECIFIED TRIMESTER: ICD-10-CM

## 2021-09-01 LAB
ALBUMIN SERPL ELPH-MCNC: 4 G/DL — SIGNIFICANT CHANGE UP (ref 3.3–5)
ALP SERPL-CCNC: 100 U/L — SIGNIFICANT CHANGE UP (ref 40–120)
ALT FLD-CCNC: 17 U/L — SIGNIFICANT CHANGE UP (ref 4–33)
ANION GAP SERPL CALC-SCNC: 14 MMOL/L — SIGNIFICANT CHANGE UP (ref 7–14)
APPEARANCE UR: CLEAR — SIGNIFICANT CHANGE UP
APTT BLD: 26.8 SEC — LOW (ref 27–36.3)
AST SERPL-CCNC: 16 U/L — SIGNIFICANT CHANGE UP (ref 4–32)
BACTERIA # UR AUTO: NEGATIVE — SIGNIFICANT CHANGE UP
BASOPHILS # BLD AUTO: 0.02 K/UL — SIGNIFICANT CHANGE UP (ref 0–0.2)
BASOPHILS NFR BLD AUTO: 0.2 % — SIGNIFICANT CHANGE UP (ref 0–2)
BILIRUB SERPL-MCNC: 0.3 MG/DL — SIGNIFICANT CHANGE UP (ref 0.2–1.2)
BILIRUB UR-MCNC: NEGATIVE — SIGNIFICANT CHANGE UP
BLD GP AB SCN SERPL QL: NEGATIVE — SIGNIFICANT CHANGE UP
BUN SERPL-MCNC: 13 MG/DL — SIGNIFICANT CHANGE UP (ref 7–23)
CALCIUM SERPL-MCNC: 8.9 MG/DL — SIGNIFICANT CHANGE UP (ref 8.4–10.5)
CHLORIDE SERPL-SCNC: 103 MMOL/L — SIGNIFICANT CHANGE UP (ref 98–107)
CO2 SERPL-SCNC: 19 MMOL/L — LOW (ref 22–31)
COLOR SPEC: YELLOW — SIGNIFICANT CHANGE UP
CREAT ?TM UR-MCNC: 156 MG/DL — SIGNIFICANT CHANGE UP
CREAT SERPL-MCNC: 0.8 MG/DL — SIGNIFICANT CHANGE UP (ref 0.5–1.3)
DIFF PNL FLD: ABNORMAL
EOSINOPHIL # BLD AUTO: 0.1 K/UL — SIGNIFICANT CHANGE UP (ref 0–0.5)
EOSINOPHIL NFR BLD AUTO: 1 % — SIGNIFICANT CHANGE UP (ref 0–6)
EPI CELLS # UR: 1 /HPF — SIGNIFICANT CHANGE UP (ref 0–5)
FIBRINOGEN PPP-MCNC: 610 MG/DL — HIGH (ref 290–520)
GLUCOSE SERPL-MCNC: 98 MG/DL — SIGNIFICANT CHANGE UP (ref 70–99)
GLUCOSE UR QL: NEGATIVE — SIGNIFICANT CHANGE UP
HCT VFR BLD CALC: 35.7 % — SIGNIFICANT CHANGE UP (ref 34.5–45)
HGB BLD-MCNC: 12.4 G/DL — SIGNIFICANT CHANGE UP (ref 11.5–15.5)
HYALINE CASTS # UR AUTO: 2 /LPF — SIGNIFICANT CHANGE UP (ref 0–7)
IANC: 7.94 K/UL — SIGNIFICANT CHANGE UP (ref 1.5–8.5)
IMM GRANULOCYTES NFR BLD AUTO: 0.7 % — SIGNIFICANT CHANGE UP (ref 0–1.5)
INR BLD: 0.95 RATIO — SIGNIFICANT CHANGE UP (ref 0.88–1.16)
KETONES UR-MCNC: NEGATIVE — SIGNIFICANT CHANGE UP
LDH SERPL L TO P-CCNC: 179 U/L — SIGNIFICANT CHANGE UP (ref 135–225)
LEUKOCYTE ESTERASE UR-ACNC: NEGATIVE — SIGNIFICANT CHANGE UP
LYMPHOCYTES # BLD AUTO: 1.37 K/UL — SIGNIFICANT CHANGE UP (ref 1–3.3)
LYMPHOCYTES # BLD AUTO: 13.2 % — SIGNIFICANT CHANGE UP (ref 13–44)
MCHC RBC-ENTMCNC: 31.4 PG — SIGNIFICANT CHANGE UP (ref 27–34)
MCHC RBC-ENTMCNC: 34.7 GM/DL — SIGNIFICANT CHANGE UP (ref 32–36)
MCV RBC AUTO: 90.4 FL — SIGNIFICANT CHANGE UP (ref 80–100)
MONOCYTES # BLD AUTO: 0.84 K/UL — SIGNIFICANT CHANGE UP (ref 0–0.9)
MONOCYTES NFR BLD AUTO: 8.1 % — SIGNIFICANT CHANGE UP (ref 2–14)
NEUTROPHILS # BLD AUTO: 7.94 K/UL — HIGH (ref 1.8–7.4)
NEUTROPHILS NFR BLD AUTO: 76.8 % — SIGNIFICANT CHANGE UP (ref 43–77)
NITRITE UR-MCNC: NEGATIVE — SIGNIFICANT CHANGE UP
NRBC # BLD: 0 /100 WBCS — SIGNIFICANT CHANGE UP
NRBC # FLD: 0 K/UL — SIGNIFICANT CHANGE UP
PH UR: 6 — SIGNIFICANT CHANGE UP (ref 5–8)
PLATELET # BLD AUTO: 177 K/UL — SIGNIFICANT CHANGE UP (ref 150–400)
POTASSIUM SERPL-MCNC: 3.8 MMOL/L — SIGNIFICANT CHANGE UP (ref 3.5–5.3)
POTASSIUM SERPL-SCNC: 3.8 MMOL/L — SIGNIFICANT CHANGE UP (ref 3.5–5.3)
PROT ?TM UR-MCNC: 28 MG/DL — SIGNIFICANT CHANGE UP
PROT ?TM UR-MCNC: 28 MG/DL — SIGNIFICANT CHANGE UP
PROT SERPL-MCNC: 6.7 G/DL — SIGNIFICANT CHANGE UP (ref 6–8.3)
PROT UR-MCNC: ABNORMAL
PROT/CREAT UR-RTO: 0.2 RATIO — SIGNIFICANT CHANGE UP (ref 0–0.2)
PROTHROM AB SERPL-ACNC: 10.9 SEC — SIGNIFICANT CHANGE UP (ref 10.6–13.6)
RBC # BLD: 3.95 M/UL — SIGNIFICANT CHANGE UP (ref 3.8–5.2)
RBC # FLD: 13.3 % — SIGNIFICANT CHANGE UP (ref 10.3–14.5)
RBC CASTS # UR COMP ASSIST: 2 /HPF — SIGNIFICANT CHANGE UP (ref 0–4)
RH IG SCN BLD-IMP: POSITIVE — SIGNIFICANT CHANGE UP
RH IG SCN BLD-IMP: POSITIVE — SIGNIFICANT CHANGE UP
SARS-COV-2 RNA SPEC QL NAA+PROBE: SIGNIFICANT CHANGE UP
SODIUM SERPL-SCNC: 136 MMOL/L — SIGNIFICANT CHANGE UP (ref 135–145)
SP GR SPEC: 1.02 — SIGNIFICANT CHANGE UP (ref 1–1.05)
URATE SERPL-MCNC: 6 MG/DL — SIGNIFICANT CHANGE UP (ref 2.5–7)
UROBILINOGEN FLD QL: SIGNIFICANT CHANGE UP
WBC # BLD: 10.34 K/UL — SIGNIFICANT CHANGE UP (ref 3.8–10.5)
WBC # FLD AUTO: 10.34 K/UL — SIGNIFICANT CHANGE UP (ref 3.8–10.5)
WBC UR QL: 3 /HPF — SIGNIFICANT CHANGE UP (ref 0–5)

## 2021-09-01 PROCEDURE — 76816 OB US FOLLOW-UP PER FETUS: CPT

## 2021-09-01 PROCEDURE — 76818 FETAL BIOPHYS PROFILE W/NST: CPT

## 2021-09-01 PROCEDURE — 76820 UMBILICAL ARTERY ECHO: CPT

## 2021-09-01 PROCEDURE — 99212 OFFICE O/P EST SF 10 MIN: CPT | Mod: 25

## 2021-09-01 PROCEDURE — 99221 1ST HOSP IP/OBS SF/LOW 40: CPT

## 2021-09-01 RX ORDER — MAGNESIUM SULFATE 500 MG/ML
2 VIAL (ML) INJECTION
Qty: 40 | Refills: 0 | Status: DISCONTINUED | OUTPATIENT
Start: 2021-09-01 | End: 2021-09-02

## 2021-09-01 RX ORDER — MAGNESIUM SULFATE 500 MG/ML
4 VIAL (ML) INJECTION ONCE
Refills: 0 | Status: COMPLETED | OUTPATIENT
Start: 2021-09-01 | End: 2021-09-01

## 2021-09-01 RX ORDER — CETIRIZINE HYDROCHLORIDE 10 MG/1
0 TABLET ORAL
Qty: 0 | Refills: 0 | DISCHARGE

## 2021-09-01 RX ORDER — ACETAMINOPHEN 500 MG
975 TABLET ORAL ONCE
Refills: 0 | Status: COMPLETED | OUTPATIENT
Start: 2021-09-01 | End: 2021-09-01

## 2021-09-01 RX ORDER — MOMETASONE FUROATE 220 UG/1
2 INHALANT RESPIRATORY (INHALATION)
Refills: 0 | Status: DISCONTINUED | OUTPATIENT
Start: 2021-09-01 | End: 2021-09-02

## 2021-09-01 RX ORDER — NIFEDIPINE 30 MG
10 TABLET, EXTENDED RELEASE 24 HR ORAL ONCE
Refills: 0 | Status: COMPLETED | OUTPATIENT
Start: 2021-09-01 | End: 2021-09-01

## 2021-09-01 RX ORDER — LABETALOL HCL 100 MG
20 TABLET ORAL ONCE
Refills: 0 | Status: DISCONTINUED | OUTPATIENT
Start: 2021-09-01 | End: 2021-09-01

## 2021-09-01 RX ADMIN — Medication 975 MILLIGRAM(S): at 22:45

## 2021-09-01 RX ADMIN — Medication 50 GM/HR: at 18:00

## 2021-09-01 RX ADMIN — Medication 300 GRAM(S): at 17:38

## 2021-09-01 RX ADMIN — Medication 10 MILLIGRAM(S): at 16:40

## 2021-09-01 RX ADMIN — Medication 975 MILLIGRAM(S): at 21:45

## 2021-09-01 NOTE — OB PROVIDER H&P - HISTORY OF PRESENT ILLNESS
40yo  @ 33.1 presents sent from [facility] for evaluation. EFW today found to be in 1st percentile with elevated dopplers and /90x 2. Denies HA, N/V, epigastric pain, visual changes. Reports she had one other elevated BP in the office on  with normal HELLP labs. Patient for 36 week primary  for previous myomectomy. Gets care with OB from [facility] but will be delivered here due to fetal cardiac alert.   Fully vaccinated for COVID-19    H/O Hypothyroid- Synthroid 100mcg  Asthma- Pulmacort started 2 months ago due to coughing in the morning. Last asthma attack 4 years ago.  2019 Myomectomy-         Reports she got fentanyl and dilauded very close together and coded likely due to too many narcotics, had anesthesia consult here in anticipation of her .   Polycystic Kidneys- followed by Dr. Romero at [place]- gets renal US every 6 months.   Depression- therapy years ago

## 2021-09-01 NOTE — OB PROVIDER TRIAGE NOTE - NS_OBGYNHISTORY_OBGYN_ALL_OB_FT
denies AP course complicated by:  IUGR- in first percentile on US today    FETAL ALERT  Small to moderate sized pericardial effusion (asymmetrical).  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  Mild right ventricular hypertrophy.   peds cardiology evaluation recommended in the NICU within 12-24 hrs after birth or sooner if there is an urgent clinical concern.  Normal karyotype and array. SGA at 4th percentile.  -Hazel Nair, RNC  GHTN     [facility] US today:  BPP , anterior placenta, YADIRA 15.4, EFW 1567g 1st %.

## 2021-09-01 NOTE — OB PROVIDER TRIAGE NOTE - NSHPPHYSICALEXAM_GEN_ALL_CORE
Assessment reveals VSS, abdomen soft, NT, gravid.   Cat 1 FHT, no ctx on toco    HELLP labs sent. Assessment reveals VSS, abdomen soft, NT, gravid.   BP range 149/97- 154/96 pulse 61-75  +1 DTR,   No pheripheral edema  Lungs CTA bilaterally  Normal S1, S2 RRR  Cat 1 FHT, no ctx on toco    HELLP labs sent.    HELLP labs WNL  PCR 0.2    D/W Dr. Marquez  [facility] consult called    1605: Dr. Contreras and Dr. Golden in to evaluate patient. Assessment reveals VSS, abdomen soft, NT, gravid.   BP range 149/97- 154/96 pulse 61-75  +1 DTR,   No pheripheral edema  Lungs CTA bilaterally  Normal S1, S2 RRR  Cat 1 FHT, no ctx on toco    HELLP labs sent.    HELLP labs WNL  PCR 0.2    D/W Dr. Marquez  [facility] consult called    1605: Dr. Contreras and Dr. Golden in to evaluate patient.    1637: Procardia 10mg IR given for 2 severe range BP's.

## 2021-09-01 NOTE — OB PROVIDER H&P - ASSESSMENT
Admit for observation r/o PEC  24 hour urine collection  Betamethasone  D/W Dr. Marquez Admit for observation r/o PEC  24 hour urine collection  Betamethasone  COVID-19 PCR  Repeat labs in AM  D/W Dr. Marquez

## 2021-09-01 NOTE — OB PROVIDER H&P - NS_OBGYNHISTORY_OBGYN_ALL_OB_FT
AP course complicated by:  IUGR- in first percentile on US today    FETAL ALERT  Small to moderate sized pericardial effusion (asymmetrical).  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  Mild right ventricular hypertrophy.   peds cardiology evaluation recommended in the NICU within 12-24 hrs after birth or sooner if there is an urgent clinical concern.  Normal karyotype and array. SGA at 4th percentile.  -Hazel Nair, RNC  GHTN     [facility] US today:  BPP , anterior placenta, YADIRA 15.4, EFW 1567g 1st %.

## 2021-09-01 NOTE — OB PROVIDER TRIAGE NOTE - HISTORY OF PRESENT ILLNESS
40yo  @ 33.1 presents sent from [facility] for evaluation. EFW today found to be in 1st percentile with elevated dopplers and /90x 2. Denies HA, N/V, epigastric pain, visual changes. Reports she had one other elevated BP in the office on  with normal HELLP labs. Patient for 36 week primary  for previous myomectomy. Gets care with OB from [facility] but will be delivered here due to fetal cardiac alert.   Fully vaccinated for COVID-19    H/O Hypothyroid- Synthroid 100mcg  Asthma- Pulmacort  2019 Myomectomy  Polycystic Kidneys- followed by Dr. Romero at [place]- gets renal US every 6 months.   Depression- therapy years ago 40yo  @ 33.1 presents sent from [facility] for evaluation. EFW today found to be in 1st percentile with elevated dopplers and /90x 2. Denies HA, N/V, epigastric pain, visual changes. Reports she had one other elevated BP in the office on  with normal HELLP labs. Patient for 36 week primary  for previous myomectomy. Gets care with OB from [facility] but will be delivered here due to fetal cardiac alert.   Fully vaccinated for COVID-19    H/O Hypothyroid- Synthroid 100mcg  Asthma- Pulmacort started 2 months ago due to coughing in the morning. Last asthma attack 4 years ago.  2019 Myomectomy-         Reports she got fentanyl and dilauded very close together and coded likely due to too many narcotics, had anesthesia consult here in anticipation of her .   Polycystic Kidneys- followed by Dr. Romero at [place]- gets renal US every 6 months.   Depression- therapy years ago

## 2021-09-01 NOTE — PROGRESS NOTE ADULT - SUBJECTIVE AND OBJECTIVE BOX
MFM Consult Note     40yo  at 33w1d with PCKD, asthma and h/o myomectomy as well as newly diagnosed fetal IUGR 1%ile, elevated S/D ratio on umbilical artery dopplers and stable known pericardial effusion, presenting with elevated BP in the ATU. Patient denies any headache, vision changes, RUQ pain. She does endorse having new stressors in her life and history of elevated BP in the past when she was under extreme stress. Patient reports normal fetal movement, denies contractions, LOF or vaginal bleeding.     Vital Signs Last 24 Hrs  T(C): 37.2 (01 Sep 2021 14:30), Max: 37.2 (01 Sep 2021 13:58)  T(F): 98.96 (01 Sep 2021 14:30), Max: 99 (01 Sep 2021 13:58)  HR: 64 (01 Sep 2021 16:23) (60 - 75)  BP: 153/97 (01 Sep 2021 16:23) (142/98 - 172/99)  BP(mean): --  RR: 17 (01 Sep 2021 13:58) (17 - 17)  SpO2: --                          12.4   10.34 )-----------( 177      ( 01 Sep 2021 15:09 )             35.7   09-    136  |  103  |  13  ----------------------------<  98  3.8   |  19<L>  |  0.80    Ca    8.9      01 Sep 2021 15:09    TPro  6.7  /  Alb  4.0  /  TBili  0.3  /  DBili  x   /  AST  16  /  ALT  17  /  AlkPhos  100     MFM Consult Note     40yo  at 33w1d with PCKD, asthma and h/o myomectomy as well as newly diagnosed fetal IUGR 1%ile, elevated S/D ratio on umbilical artery dopplers and stable known pericardial effusion, presenting with elevated BP in the ATU. Patient denies any headache, vision changes, RUQ pain. She does endorse having new stressors in her life and history of elevated BP in the past when she was under extreme stress. Patient reports normal fetal movement, denies contractions, LOF or vaginal bleeding.     Vital Signs Last 24 Hrs  T(C): 37.2 (01 Sep 2021 14:30), Max: 37.2 (01 Sep 2021 13:58)  T(F): 98.96 (01 Sep 2021 14:30), Max: 99 (01 Sep 2021 13:58)  HR: 64 (01 Sep 2021 16:23) (60 - 75)  BP: 153/97 (01 Sep 2021 16:23) (142/98 - 172/99)  BP(mean): --  RR: 17 (01 Sep 2021 13:58) (17 - 17)  SpO2: --                          12.4   10.34 )-----------( 177      ( 01 Sep 2021 15:09 )             35.7   09-01    136  |  103  |  13  ----------------------------<  98  3.8   |  19<L>  |  0.80    Ca    8.9      01 Sep 2021 15:09    TPro  6.7  /  Alb  4.0  /  TBili  0.3  /  DBili  x   /  AST  16  /  ALT  17  /  AlkPhos  100  09-01    EFM: baseline 125, moderate variability, -decels, +accels   [place]: intermittent rare contractions

## 2021-09-01 NOTE — OB RN PATIENT PROFILE - NS_OBGYNHISTORY_OBGYN_ALL_OB_FT
AP course complicated by:  IUGR- in first percentile on US today    FETAL ALERT  Small to moderate sized pericardial effusion (asymmetrical).  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  Mild right ventricular hypertrophy.   peds cardiology evaluation recommended in the NICU within 12-24 hrs after birth or sooner if there is an urgent clinical concern.  Normal karyotype and array. SGA at 4th percentile.  -Hazel Nair, RNC  GHTN     [facility] US today:  BPP , anterior placenta, YADIRA 15.4, EFW 1567g 1st %.

## 2021-09-01 NOTE — OB RN TRIAGE NOTE - NSMATERNALFETALCONCERNS_OBGYN_ALL_OB_FT
FETAL ALERT  Small to moderate sized pericardial effusion (asymmetrical).  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  Mild right ventricular hypertrophy.   peds cardiology evaluation recommended in the NICU within 12-24 hrs after birth or sooner if there is an urgent clinical concern.  Normal karyotype and array. SGA at 4th percentile.  -Hazel Nair, RNC

## 2021-09-01 NOTE — OB PROVIDER H&P - NSHPPHYSICALEXAM_GEN_ALL_CORE
Assessment reveals VSS, abdomen soft, NT, gravid.   BP range 149/97- 154/96 pulse 61-75  +1 DTR,   No pheripheral edema  Lungs CTA bilaterally  Normal S1, S2 RRR  Cat 1 FHT, no ctx on toco    HELLP labs sent.    HELLP labs WNL  PCR 0.2    D/W Dr. Marquez  [facility] consult called    1605: Dr. Contreras and Dr. Golden in to evaluate patient.    1637: Procardia 10mg IR given for 2 severe range BP's.

## 2021-09-01 NOTE — OB PROVIDER H&P - ATTENDING COMMENTS
40yo  @ 33.1 EFW today 1st percentile with elevated dopplers and /90x 2. Denies HA, N/V, epigastric pain, visual changes. Needs delivery at [facility] due to the above fetal cardiac finding above.   +FM, no LOF, no VB, no contractions felt    SurgHx: laparoscopic myomectomy   ObHx:   Gyn: fibroids  Med: PCKD, hypothyroid, asthma, history of depression  NKDA, She received, fentanyl and dilauded very close together and had a cardiopulmonary arrest in the past.  She has had anesthesia consult at [facility].   Social: denies alcohol, drugs, smoking    Vital Signs Last 24 Hrs  T(C): 36.8 (01 Sep 2021 21:15), Max: 37.2 (01 Sep 2021 13:58)  T(F): 98.24 (01 Sep 2021 21:15), Max: 99 (01 Sep 2021 13:58)  HR: 85 (01 Sep 2021 22:57) (60 - 148)  BP: 132/70 (01 Sep 2021 22:51) (132/70 - 173/86)  BP(mean): --  RR: 18 (01 Sep 2021 16:58) (17 - 18)  SpO2: 96% (01 Sep 2021 22:39) (95% - 98%)  I&O's Summary  01 Sep 2021 07:01  -  01 Sep 2021 23:01  --------------------------------------------------------  IN: 750 mL / OUT: 1200 mL / NET: -450 mL                    12.4   10.34 )-----------( 177      ( 01 Sep 2021 15:09 )             35.7     MEDICATIONS  (STANDING):  betamethasone Injectable 12 milliGRAM(s) IntraMuscular every 24 hours  influenza   Vaccine 0.5 milliLiter(s) IntraMuscular once  lactated ringers. 1000 milliLiter(s) (50 mL/Hr) IV Continuous <Continuous>  levothyroxine 100 MICROGram(s) Oral daily  magnesium sulfate Infusion 2 Gm/Hr (50 mL/Hr) IV Continuous <Continuous>  mometasone 220 MICROgram(s) Inhaler 2 Puff(s) Inhalation two times a day  NIFEdipine XL 30 milliGRAM(s) Oral daily  prenatal multivitamin 1 Tablet(s) Oral daily  MEDICATIONS  (PRN):  ALBUTerol    90 MICROgram(s) HFA Inhaler 2 Puff(s) Inhalation every 6 hours PRN Shortness of Breath and/or Wheezing    PHYSICAL EXAM:  CV:RRR, S1S2  RespCTABL  Abd: gravid, NT  Extremities: non-tender bilateraly, normal DTRs, no edema  Pelvis: deferred  40yo  @ 33 1/7, with preeclampsia, IUGR, hx myomectomy   preeclampsia:  Magnesium drip  nifedipine xl 30 mg   Monitor BPs  Betamethasone given in anticipation of pre term delivery  She will need c/s when delivery is indicated.  Active Type and Screen  HELLP labs stable  Repeat tomorrow    Asthma: c/w home meds  Hypothyroid: c/w synthroid  MFM consult   NICU consult  DVT ppx with SubCut Heparin  Continue to monitor in house    Jennifer Marquez MD

## 2021-09-01 NOTE — PROGRESS NOTE ADULT - ASSESSMENT
A/P: 42yo  at 33w1d with PCKD, asthma and h/o myomectomy as well as newly diagnosed fetal IUGR 1%ile, elevated S/D ratio on umbilical artery dopplers and stable known pericardial effusion, presenting with elevated BP in the ATU now meeting criteria for severe preeclampsia with multiple severe range BP.   - Plan for BMZ and NICU consult  - Will discuss with renal appropriate antihypertensive regimen given patient's PCKD and asthma, however given sustained severe range BP at this time will give nifedipine 10 IR  - Magnesium to be initiated at this time, close monitoring of mag level q6 hours and continuous fetal monitoring while on magnesium   - Continue close BP monitoring     Carly Hirschberg, MFM Fellow   Discussed with MERLE Jaime Attending

## 2021-09-02 ENCOUNTER — APPOINTMENT (OUTPATIENT)
Dept: OBGYN | Facility: CLINIC | Age: 41
End: 2021-09-02

## 2021-09-02 ENCOUNTER — TRANSCRIPTION ENCOUNTER (OUTPATIENT)
Age: 41
End: 2021-09-02

## 2021-09-02 DIAGNOSIS — O14.90 UNSPECIFIED PRE-ECLAMPSIA, UNSPECIFIED TRIMESTER: ICD-10-CM

## 2021-09-02 DIAGNOSIS — Q61.3 POLYCYSTIC KIDNEY, UNSPECIFIED: ICD-10-CM

## 2021-09-02 PROBLEM — Z87.448 PERSONAL HISTORY OF OTHER DISEASES OF URINARY SYSTEM: Chronic | Status: ACTIVE | Noted: 2021-09-01

## 2021-09-02 PROBLEM — E03.9 HYPOTHYROIDISM, UNSPECIFIED: Chronic | Status: ACTIVE | Noted: 2021-09-01

## 2021-09-02 PROBLEM — J45.909 UNSPECIFIED ASTHMA, UNCOMPLICATED: Chronic | Status: ACTIVE | Noted: 2021-09-01

## 2021-09-02 LAB
ALBUMIN SERPL ELPH-MCNC: 3.8 G/DL — SIGNIFICANT CHANGE UP (ref 3.3–5)
ALP SERPL-CCNC: 105 U/L — SIGNIFICANT CHANGE UP (ref 40–120)
ALT FLD-CCNC: 20 U/L — SIGNIFICANT CHANGE UP (ref 4–33)
ANION GAP SERPL CALC-SCNC: 14 MMOL/L — SIGNIFICANT CHANGE UP (ref 7–14)
APTT BLD: 26.2 SEC — LOW (ref 27–36.3)
AST SERPL-CCNC: 18 U/L — SIGNIFICANT CHANGE UP (ref 4–32)
BASOPHILS # BLD AUTO: 0.02 K/UL — SIGNIFICANT CHANGE UP (ref 0–0.2)
BASOPHILS NFR BLD AUTO: 0.1 % — SIGNIFICANT CHANGE UP (ref 0–2)
BILIRUB SERPL-MCNC: 0.3 MG/DL — SIGNIFICANT CHANGE UP (ref 0.2–1.2)
BUN SERPL-MCNC: 8 MG/DL — SIGNIFICANT CHANGE UP (ref 7–23)
CALCIUM SERPL-MCNC: 7.5 MG/DL — LOW (ref 8.4–10.5)
CHLORIDE SERPL-SCNC: 100 MMOL/L — SIGNIFICANT CHANGE UP (ref 98–107)
CO2 SERPL-SCNC: 15 MMOL/L — LOW (ref 22–31)
COVID-19 SPIKE DOMAIN AB INTERP: POSITIVE
COVID-19 SPIKE DOMAIN ANTIBODY RESULT: >250 U/ML — HIGH
CREAT SERPL-MCNC: 0.67 MG/DL — SIGNIFICANT CHANGE UP (ref 0.5–1.3)
EOSINOPHIL # BLD AUTO: 0 K/UL — SIGNIFICANT CHANGE UP (ref 0–0.5)
EOSINOPHIL NFR BLD AUTO: 0 % — SIGNIFICANT CHANGE UP (ref 0–6)
FIBRINOGEN PPP-MCNC: 643 MG/DL — HIGH (ref 290–520)
GLUCOSE SERPL-MCNC: 121 MG/DL — HIGH (ref 70–99)
HCT VFR BLD CALC: 38.8 % — SIGNIFICANT CHANGE UP (ref 34.5–45)
HGB BLD-MCNC: 13.3 G/DL — SIGNIFICANT CHANGE UP (ref 11.5–15.5)
IANC: 13.05 K/UL — HIGH (ref 1.5–8.5)
IMM GRANULOCYTES NFR BLD AUTO: 1 % — SIGNIFICANT CHANGE UP (ref 0–1.5)
INR BLD: 0.91 RATIO — SIGNIFICANT CHANGE UP (ref 0.88–1.16)
LDH SERPL L TO P-CCNC: 220 U/L — SIGNIFICANT CHANGE UP (ref 135–225)
LYMPHOCYTES # BLD AUTO: 1.16 K/UL — SIGNIFICANT CHANGE UP (ref 1–3.3)
LYMPHOCYTES # BLD AUTO: 7.9 % — LOW (ref 13–44)
MAGNESIUM SERPL-MCNC: 5.5 MG/DL — HIGH (ref 1.6–2.6)
MAGNESIUM SERPL-MCNC: 6.5 MG/DL — HIGH (ref 1.6–2.6)
MCHC RBC-ENTMCNC: 31.1 PG — SIGNIFICANT CHANGE UP (ref 27–34)
MCHC RBC-ENTMCNC: 34.3 GM/DL — SIGNIFICANT CHANGE UP (ref 32–36)
MCV RBC AUTO: 90.9 FL — SIGNIFICANT CHANGE UP (ref 80–100)
MONOCYTES # BLD AUTO: 0.32 K/UL — SIGNIFICANT CHANGE UP (ref 0–0.9)
MONOCYTES NFR BLD AUTO: 2.2 % — SIGNIFICANT CHANGE UP (ref 2–14)
NEUTROPHILS # BLD AUTO: 13.05 K/UL — HIGH (ref 1.8–7.4)
NEUTROPHILS NFR BLD AUTO: 88.8 % — HIGH (ref 43–77)
NRBC # BLD: 0 /100 WBCS — SIGNIFICANT CHANGE UP
NRBC # FLD: 0 K/UL — SIGNIFICANT CHANGE UP
PLATELET # BLD AUTO: 191 K/UL — SIGNIFICANT CHANGE UP (ref 150–400)
POTASSIUM SERPL-MCNC: 4.1 MMOL/L — SIGNIFICANT CHANGE UP (ref 3.5–5.3)
POTASSIUM SERPL-SCNC: 4.1 MMOL/L — SIGNIFICANT CHANGE UP (ref 3.5–5.3)
PROT SERPL-MCNC: 6.9 G/DL — SIGNIFICANT CHANGE UP (ref 6–8.3)
PROTHROM AB SERPL-ACNC: 10.4 SEC — LOW (ref 10.6–13.6)
RBC # BLD: 4.27 M/UL — SIGNIFICANT CHANGE UP (ref 3.8–5.2)
RBC # FLD: 13.3 % — SIGNIFICANT CHANGE UP (ref 10.3–14.5)
SARS-COV-2 IGG+IGM SERPL QL IA: >250 U/ML — HIGH
SARS-COV-2 IGG+IGM SERPL QL IA: POSITIVE
SODIUM SERPL-SCNC: 129 MMOL/L — LOW (ref 135–145)
T PALLIDUM AB TITR SER: NEGATIVE — SIGNIFICANT CHANGE UP
URATE SERPL-MCNC: 7 MG/DL — SIGNIFICANT CHANGE UP (ref 2.5–7)
WBC # BLD: 14.69 K/UL — HIGH (ref 3.8–10.5)
WBC # FLD AUTO: 14.69 K/UL — HIGH (ref 3.8–10.5)

## 2021-09-02 PROCEDURE — 99221 1ST HOSP IP/OBS SF/LOW 40: CPT

## 2021-09-02 PROCEDURE — 99223 1ST HOSP IP/OBS HIGH 75: CPT | Mod: GC

## 2021-09-02 RX ORDER — MOMETASONE FUROATE 220 UG/1
2 INHALANT RESPIRATORY (INHALATION)
Refills: 0 | Status: DISCONTINUED | OUTPATIENT
Start: 2021-09-02 | End: 2021-09-02

## 2021-09-02 RX ORDER — ACETAMINOPHEN 500 MG
975 TABLET ORAL ONCE
Refills: 0 | Status: COMPLETED | OUTPATIENT
Start: 2021-09-02 | End: 2021-09-02

## 2021-09-02 RX ORDER — NIFEDIPINE 30 MG
10 TABLET, EXTENDED RELEASE 24 HR ORAL ONCE
Refills: 0 | Status: COMPLETED | OUTPATIENT
Start: 2021-09-02 | End: 2021-09-02

## 2021-09-02 RX ADMIN — Medication 975 MILLIGRAM(S): at 11:53

## 2021-09-02 RX ADMIN — Medication 975 MILLIGRAM(S): at 12:23

## 2021-09-02 RX ADMIN — Medication 50 GM/HR: at 02:19

## 2021-09-02 RX ADMIN — Medication 10 MILLIGRAM(S): at 20:42

## 2021-09-02 NOTE — PROGRESS NOTE ADULT - SUBJECTIVE AND OBJECTIVE BOX
MFM Progress Aquiles     41y yo  at 33 weeks 2days admitted with PEC with severe features and IUGR (1%) with elevated S/D ratio on UA dopplers. Additionally, this pregnancy is complicated by a fetal pericardial effusion and patient is followed by peds cardiology. She received Procardia 10IR x1 in triage yesterday afternoon for sustained severe range BP and was started on magnesium for seizure ppx yesterday as well as procardia 30XL. Patient states that this morning she continues to feel well. She denies headaches, vision changes, abdominal pain. She endorses good fetal movement, denies contractions, LOF or vaginal bleeding. Patient states that she understands what has occurred thus far and has no questions about her current admission.     Vital Signs Last 24 Hrs  T(C): 36.6 (02 Sep 2021 10:32), Max: 37.2 (01 Sep 2021 13:58)  T(F): 97.8 (02 Sep 2021 10:32), Max: 99 (01 Sep 2021 13:58)  HR: 94 (02 Sep 2021 10:45) (60 - 148)  BP: 138/92 (02 Sep 2021 10:32) (111/64 - 173/86)  BP(mean): --  RR: 17 (02 Sep 2021 10:32) (16 - 18)  SpO2: 99% (02 Sep 2021 10:45) (93% - 99%)    Gen: NAD   Pulm: Nonlabored breathing   CV: RRR   Abd: Gravid, soft                           13.3   14.69 )-----------( 191      ( 02 Sep 2021 06:11 )             38.8       129<L>  |  100  |  8   ----------------------------<  121<H>  4.1   |  15<L>  |  0.67    Ca    7.5<L>      02 Sep 2021 06:11  Mg     6.50         TPro  6.9  /  Alb  3.8  /  TBili  0.3  /  DBili  x   /  AST  18  /  ALT  20  /  AlkPhos  105

## 2021-09-02 NOTE — PROGRESS NOTE ADULT - ASSESSMENT
AP 41y yo  at 33.2w admitted with sPEC and IUGR (1%) with elevated UA dopplers. She received Procardia 10IR x1 in triage yesterday afternoon for sustained severe range BP and was started on magnesium for seizure ppx yesterday. BPs well controlled on Procardia 30XL.    #sPEC  - c/w Mg for seizure ppx  - c/w Procardia 30XL for BP control  - AM HELLP labs  - Monitor BPs    #Maternal wellbeing  - NPO  - HSQ/SCDs for DVT ppx  - PNV/Iron/Folic acid  -f/u Renal recs for hx of PCKD  - c/w home albuterol and pulmicort for hx of Asthma  - c/w home Synthroid for hx of hypothyroidism    #Fetal wellbeing  - BMZ  - Continuous monitoring - overall reassuring; consider switching to NST BID  - s/p NICU consult ()  - f/u GBS      RFrankel PGY3 AP 41y yo  at 33.2w admitted with sPEC and IUGR (1%) with elevated UA dopplers. She received Procardia 10IR x1 in triage yesterday afternoon for sustained severe range BP and was started on magnesium for seizure ppx yesterday. BPs well controlled on Procardia 30XL.    #sPEC  - c/w Mg for seizure ppx  - c/w Procardia 30XL for BP control  - AM HELLP labs  - Monitor BPs    #Maternal wellbeing  - NPO  - HSQ/SCDs for DVT ppx  - PNV/Iron/Folic acid  -f/u Renal recs for hx of PCKD  - c/w home albuterol and pulmicort for hx of Asthma  - c/w home Synthroid for hx of hypothyroidism  -Anesthesia consult for hx of cardiac arrest ? / fentanyl/dilaudid during lsc myomectomy in 2019    #Fetal wellbeing  - BMZ  - Continuous monitoring - overall reassuring; consider switching to NST BID  - s/p NICU consult ()  - f/u GBS      RFrankel PGY3

## 2021-09-02 NOTE — CONSULT NOTE ADULT - REASON FOR ADMISSION
at 33 wks with blood pressure 160/90 and EFN in the 1st percentile with elevated dopplers  at 33 wks with blood pressure 160/90 and EFW in the 1st percentile with elevated dopplers

## 2021-09-02 NOTE — PROGRESS NOTE ADULT - ASSESSMENT
A/P: 41y yo  at 33 weeks 2days admitted with PEC with severe features and IUGR (1%) with elevated S/D ratio on UA dopplers with fetus with a known pericardial effusion. Patient now on procardia 30XL with well controlled BPs and normal tox labs, therefore will stop magnesium at this time. Fetal status reassuring on continuous monitoring over night, will space to BID NST. Plan for second dose of BMZ this afternoon. Will admit patient to the hospital for BP monitoring until delivery at 34weeks given severe preeclampsia.     - Appreciate renal recommendations  - Will continue procardia 30XL and monitor BP closely   - Will reach out to pediatric cardiology for follow up fetal ECHOs to be done in house   - Daily HELLP labs to be sent   - BMZ #2 9/2 PM and BID NST  - Plan for delivery at 34 weeks      Carly Hirschberg, MFM Fellow   Seen and Discussed with MERLE Lagos Attending

## 2021-09-02 NOTE — CONSULT NOTE ADULT - SUBJECTIVE AND OBJECTIVE BOX
[facility] DIVISION OF KIDNEY DISEASES AND HYPERTENSION -- [phone]  -- INITIAL CONSULT NOTE  --------------------------------------------------------------------------------  HPI:        PAST HISTORY  --------------------------------------------------------------------------------  PAST MEDICAL & SURGICAL HISTORY:  H/O adult polycystic kidney disease    Hypothyroid    Asthma    H/O: myomectomy  2019      FAMILY HISTORY:    PAST SOCIAL HISTORY:    ALLERGIES & MEDICATIONS  --------------------------------------------------------------------------------  Allergies    No Known Allergies    Intolerances      Standing Inpatient Medications  betamethasone Injectable 12 milliGRAM(s) IntraMuscular every 24 hours  buDESOnide   90 MICROgram(s) Inhaler 2 Puff(s) Inhalation two times a day  heparin   Injectable 5000 Unit(s) SubCutaneous every 12 hours  influenza   Vaccine 0.5 milliLiter(s) IntraMuscular once  lactated ringers. 1000 milliLiter(s) IV Continuous <Continuous>  levothyroxine 100 MICROGram(s) Oral daily  magnesium sulfate Infusion 2 Gm/Hr IV Continuous <Continuous>  NIFEdipine XL 30 milliGRAM(s) Oral daily  prenatal multivitamin 1 Tablet(s) Oral daily    PRN Inpatient Medications  ALBUTerol    90 MICROgram(s) HFA Inhaler 2 Puff(s) Inhalation every 6 hours PRN      REVIEW OF SYSTEMS  --------------------------------------------------------------------------------  Gen: No fevers/chills  Skin: No rashes  Head/Eyes/Ears: Normal hearing,   Respiratory: No dyspnea, cough  CV: No chest pain  GI: No abdominal pain, diarrhea  : No dysuria, hematuria  MSK: No  edema  Heme: No easy bruising or bleeding  Psych: No significant depression    All other systems were reviewed and are negative, except as noted.    VITALS/PHYSICAL EXAM  --------------------------------------------------------------------------------  T(C): 36.8 (09-02-21 @ 08:54), Max: 37.2 (09-01-21 @ 13:58)  HR: 86 (09-02-21 @ 09:20) (60 - 148)  BP: 141/90 (09-02-21 @ 08:54) (111/64 - 173/86)  RR: 18 (09-02-21 @ 08:54) (16 - 18)  SpO2: 99% (09-02-21 @ 09:25) (93% - 99%)  Wt(kg): --  Height (cm): 157.5 (09-01-21 @ 16:58)  Weight (kg): 85.3 (09-01-21 @ 16:58)  BMI (kg/m2): 34.4 (09-01-21 @ 16:58)  BSA (m2): 1.86 (09-01-21 @ 16:58)      09-01-21 @ 07:01  -  09-02-21 @ 07:00  --------------------------------------------------------  IN: 1450 mL / OUT: 3000 mL / NET: -1550 mL      Physical Exam:  	Gen: NAD  	HEENT: MMM  	Pulm: CTA B/L  	CV: S1S2  	Abd: Soft, +BS   	Ext: No LE edema B/L  	Neuro: Awake  	Skin: Warm and dry          : no tenderness to palpation           Psych: normal affect and mood  	Vascular access:    LABS/STUDIES  --------------------------------------------------------------------------------              13.3   14.69 >-----------<  191      [09-02-21 @ 06:11]              38.8     129  |  100  |  8   ----------------------------<  121      [09-02-21 @ 06:11]  4.1   |  15  |  0.67        Ca     7.5     [09-02-21 @ 06:11]      Mg     6.50     [09-02-21 @ 06:11]    TPro  6.9  /  Alb  3.8  /  TBili  0.3  /  DBili  x   /  AST  18  /  ALT  20  /  AlkPhos  105  [09-02-21 @ 06:11]    PT/INR: PT 10.4 , INR 0.91       [09-02-21 @ 06:11]  PTT: 26.2       [09-02-21 @ 06:11]    Uric acid 7.0      [09-02-21 @ 06:11]        [09-02-21 @ 06:11]    Creatinine Trend:  SCr 0.67 [09-02 @ 06:11]  SCr 0.80 [09-01 @ 15:09]    Urinalysis - [09-01-21 @ 15:09]      Color Yellow / Appearance Clear / SG 1.020 / pH 6.0      Gluc Negative / Ketone Negative  / Bili Negative / Urobili <2 mg/dL       Blood Small / Protein Trace / Leuk Est Negative / Nitrite Negative      RBC 2 / WBC 3 / Hyaline 2 / Gran  / Sq Epi  / Non Sq Epi 1 / Bacteria Negative    Urine Creatinine 156      [09-01-21 @ 15:09]  Urine Protein 28      [09-01-21 @ 15:09]        Syphilis Screen (Treponema Pallidum Ab) Negative      [09-02-21 @ 02:32]   [facility] DIVISION OF KIDNEY DISEASES AND HYPERTENSION -- [phone]  -- INITIAL CONSULT NOTE  --------------------------------------------------------------------------------  HPI:     This is a 42 yo  patient at 33 weeks gestation who presented to [facility] with /90. Patient was diagnosed with PKD in high school on ultrasound. She follows with Dr. Romero at [place] and gets renal ultrasound every 6 months. Last ultrasound on 21 showed R kidney at 12cm, mini complex cyst at 1.8 cm on right kidney and L kidney at 11.6 with 1 simple cyst. Her mother has was diagnosed during second pregnancy and required kidney transplant at 42yo.   During 32 week checkup on  140/90 but normal [facility] labs.     This morning BP measured 120/79, Cr and BUN wnl, urine showed trace protein and a small amount of blood, hyponatremic at 129.    Denied headache, visual changes, SOB, abdominal pain, burning or pain when urinating.     PAST HISTORY  --------------------------------------------------------------------------------  PAST MEDICAL & SURGICAL HISTORY:  H/O adult polycystic kidney disease    Hypothyroid    Asthma    H/O: myomectomy  2019      FAMILY HISTORY:    PAST SOCIAL HISTORY:    ALLERGIES & MEDICATIONS  --------------------------------------------------------------------------------  Allergies    No Known Allergies    Intolerances      Standing Inpatient Medications  betamethasone Injectable 12 milliGRAM(s) IntraMuscular every 24 hours  buDESOnide   90 MICROgram(s) Inhaler 2 Puff(s) Inhalation two times a day  heparin   Injectable 5000 Unit(s) SubCutaneous every 12 hours  influenza   Vaccine 0.5 milliLiter(s) IntraMuscular once  lactated ringers. 1000 milliLiter(s) IV Continuous <Continuous>  levothyroxine 100 MICROGram(s) Oral daily  magnesium sulfate Infusion 2 Gm/Hr IV Continuous <Continuous>  NIFEdipine XL 30 milliGRAM(s) Oral daily  prenatal multivitamin 1 Tablet(s) Oral daily    PRN Inpatient Medications  ALBUTerol    90 MICROgram(s) HFA Inhaler 2 Puff(s) Inhalation every 6 hours PRN      REVIEW OF SYSTEMS  --------------------------------------------------------------------------------  Gen: No fevers/chills  Skin: No rashes  Head/Eyes/Ears: Normal hearing,   Respiratory: No dyspnea, cough  CV: No chest pain  Abdomen: Gravid, no abdominal pain   GI: Diarrhea  : No dysuria, hematuria  MSK: No  edema  Heme: No easy bruising or bleeding  Psych: No significant depression    All other systems were reviewed and are negative, except as noted.    VITALS/PHYSICAL EXAM  --------------------------------------------------------------------------------  T(C): 36.8 (21 @ 08:54), Max: 37.2 (21 @ 13:58)  HR: 86 (21 @ 09:20) (60 - 148)  BP: 141/90 (21 @ 08:54) (111/64 - 173/86)  RR: 18 (21 @ 08:54) (16 - 18)  SpO2: 99% (21 @ 09:25) (93% - 99%)  Wt(kg): --  Height (cm): 157.5 (21 @ 16:58)  Weight (kg): 85.3 (21 @ 16:58)  BMI (kg/m2): 34.4 (21 16:58)  BSA (m2): 1.86 (21 @ 16:58)      21 @ 07:01  -  21 @ 07:00  --------------------------------------------------------  IN: 1450 mL / OUT: 3000 mL / NET: -1550 mL      Physical Exam:  	Gen: NAD  	HEENT: MMM  	Pulm: CTA B/L  	CV: S1S2  	Abd: Gravid  	Ext: No LE edema B/L  	Neuro: Awake  	Skin: Warm and dry          : no tenderness to palpation           Psych: normal affect and mood  	Vascular access:    LABS/STUDIES  --------------------------------------------------------------------------------              13.3   14.69 >-----------<  191      [21 @ 06:11]              38.8     129  |  100  |  8   ----------------------------<  121      [21 @ 06:11]  4.1   |  15  |  0.67        Ca     7.5     [21 06:11]      Mg     6.50     [21 06:11]    TPro  6.9  /  Alb  3.8  /  TBili  0.3  /  DBili  x   /  AST  18  /  ALT  20  /  AlkPhos  105  [21 @ 06:11]    PT/INR: PT 10.4 , INR 0.91       [09-02-21 @ 06:11]  PTT: 26.2       [21 @ 06:11]    Uric acid 7.0      [21 @ 06:11]        [21 @ 06:11]    Creatinine Trend:  SCr 0.67 [ @ 06:11]  SCr 0.80 [ 15:09]    Urinalysis - [21 @ 15:09]      Color Yellow / Appearance Clear / SG 1.020 / pH 6.0      Gluc Negative / Ketone Negative  / Bili Negative / Urobili <2 mg/dL       Blood Small / Protein Trace / Leuk Est Negative / Nitrite Negative      RBC 2 / WBC 3 / Hyaline 2 / Gran  / Sq Epi  / Non Sq Epi 1 / Bacteria Negative    Urine Creatinine 156      [21 @ 15:09]  Urine Protein 28      [21 @ 15:09]        Syphilis Screen (Treponema Pallidum Ab) Negative      [21 @ 02:32]   [facility] DIVISION OF KIDNEY DISEASES AND HYPERTENSION -- [phone]  -- INITIAL CONSULT NOTE  --------------------------------------------------------------------------------  HPI:     This is a 42 yo  patient at 33 weeks gestation who presented to [facility] with /90. Patient was diagnosed with PKD in high school on ultrasound. She follows with Dr. Romero at [place] and gets renal ultrasound every 6 months. Last ultrasound on 21 showed R kidney at 12cm, mini complex cyst at 1.8 cm on right kidney and L kidney at 11.6 with 1 simple cyst. Her mother has was diagnosed during second pregnancy and required kidney transplant at 42yo.   During 32 week checkup on  BP was 140/90 but normal [facility] labs.     This morning BP measured 120/79, Cr and BUN wnl, urine showed trace protein and a small amount of blood, hyponatremic at 129.    Denied headache, visual changes, SOB, abdominal pain, burning or pain when urinating.     PAST HISTORY  --------------------------------------------------------------------------------  PAST MEDICAL & SURGICAL HISTORY:  H/O adult polycystic kidney disease    Hypothyroid    Asthma    H/O: myomectomy  2019      FAMILY HISTORY:    PAST SOCIAL HISTORY:    ALLERGIES & MEDICATIONS  --------------------------------------------------------------------------------  Allergies    No Known Allergies    Intolerances      Standing Inpatient Medications  betamethasone Injectable 12 milliGRAM(s) IntraMuscular every 24 hours  buDESOnide   90 MICROgram(s) Inhaler 2 Puff(s) Inhalation two times a day  heparin   Injectable 5000 Unit(s) SubCutaneous every 12 hours  influenza   Vaccine 0.5 milliLiter(s) IntraMuscular once  lactated ringers. 1000 milliLiter(s) IV Continuous <Continuous>  levothyroxine 100 MICROGram(s) Oral daily  magnesium sulfate Infusion 2 Gm/Hr IV Continuous <Continuous>  NIFEdipine XL 30 milliGRAM(s) Oral daily  prenatal multivitamin 1 Tablet(s) Oral daily    PRN Inpatient Medications  ALBUTerol    90 MICROgram(s) HFA Inhaler 2 Puff(s) Inhalation every 6 hours PRN      REVIEW OF SYSTEMS  --------------------------------------------------------------------------------  Gen: No fevers/chills  Skin: No rashes  Head/Eyes/Ears: Normal hearing,   Respiratory: No dyspnea, cough  CV: No chest pain  Abdomen: Gravid, no abdominal pain   GI: Diarrhea  : No dysuria, hematuria  MSK: No  edema  Heme: No easy bruising or bleeding  Psych: No significant depression    All other systems were reviewed and are negative, except as noted.    VITALS/PHYSICAL EXAM  --------------------------------------------------------------------------------  T(C): 36.8 (21 @ 08:54), Max: 37.2 (21 @ 13:58)  HR: 86 (21 @ 09:20) (60 - 148)  BP: 141/90 (21 @ 08:54) (111/64 - 173/86)  RR: 18 (21 @ 08:54) (16 - 18)  SpO2: 99% (21 @ 09:25) (93% - 99%)  Wt(kg): --  Height (cm): 157.5 (21 @ 16:58)  Weight (kg): 85.3 (21 16:58)  BMI (kg/m2): 34.4 (21 16:58)  BSA (m2): 1.86 (21 @ 16:58)      21 @ 07:01  -  21 @ 07:00  --------------------------------------------------------  IN: 1450 mL / OUT: 3000 mL / NET: -1550 mL      Physical Exam:  	Gen: NAD  	HEENT: MMM  	Pulm: CTA B/L  	CV: S1S2  	Abd: Gravid  	Ext: No LE edema B/L  	Neuro: Awake  	Skin: Warm and dry          : no tenderness to palpation           Psych: normal affect and mood  	Vascular access:    LABS/STUDIES  --------------------------------------------------------------------------------              13.3   14.69 >-----------<  191      [21 @ 06:11]              38.8     129  |  100  |  8   ----------------------------<  121      [21 @ 06:11]  4.1   |  15  |  0.67        Ca     7.5     [21 06:11]      Mg     6.50     [21 06:11]    TPro  6.9  /  Alb  3.8  /  TBili  0.3  /  DBili  x   /  AST  18  /  ALT  20  /  AlkPhos  105  [21 @ 06:11]    PT/INR: PT 10.4 , INR 0.91       [09-02-21 @ 06:11]  PTT: 26.2       [21 @ 06:11]    Uric acid 7.0      [21 @ 06:11]        [21 @ 06:11]    Creatinine Trend:  SCr 0.67 [ @ 06:11]  SCr 0.80 [ 15:09]    Urinalysis - [21 @ 15:09]      Color Yellow / Appearance Clear / SG 1.020 / pH 6.0      Gluc Negative / Ketone Negative  / Bili Negative / Urobili <2 mg/dL       Blood Small / Protein Trace / Leuk Est Negative / Nitrite Negative      RBC 2 / WBC 3 / Hyaline 2 / Gran  / Sq Epi  / Non Sq Epi 1 / Bacteria Negative    Urine Creatinine 156      [21 @ 15:09]  Urine Protein 28      [21 @ 15:09]        Syphilis Screen (Treponema Pallidum Ab) Negative      [21 @ 02:32]   [facility] DIVISION OF KIDNEY DISEASES AND HYPERTENSION -- [phone]  -- INITIAL CONSULT NOTE  --------------------------------------------------------------------------------  HPI:     This is a 40 yo  female patient at 33 weeks gestation who presented to [facility] with /90. Patient was diagnosed with Polycystic kidney disease in high school on ultrasound. She follows with Dr. Romero at [place] (Nephrologist)  and gets a renal ultrasound every 6 months. Last ultrasound on 21 showed R kidney at 12cm, mini complex cyst at 1.8 cm on right kidney and L kidney at 11.6 with 1 simple cyst. Her mother has was diagnosed during second pregnancy and required kidney transplant at 40yo.   During 32 week checkup on  BP was 140/90 but normal [facility] labs.     This morning BP measured 120/79, Cr and BUN wnl, urine showed trace protein and a small amount of blood, hyponatremic at 129.    Denied headache, visual changes, SOB, abdominal pain, burning or pain when urinating.     PAST HISTORY  --------------------------------------------------------------------------------  PAST MEDICAL & SURGICAL HISTORY:  H/O adult polycystic kidney disease    Hypothyroid    Asthma    H/O: myomectomy  2019      FAMILY HISTORY: Mother with Polycystic kidney disease    PAST SOCIAL HISTORY: no smoking, drugs of alcohol use    ALLERGIES & MEDICATIONS  --------------------------------------------------------------------------------  Allergies    No Known Allergies    Intolerances      Standing Inpatient Medications  betamethasone Injectable 12 milliGRAM(s) IntraMuscular every 24 hours  buDESOnide   90 MICROgram(s) Inhaler 2 Puff(s) Inhalation two times a day  heparin   Injectable 5000 Unit(s) SubCutaneous every 12 hours  influenza   Vaccine 0.5 milliLiter(s) IntraMuscular once  lactated ringers. 1000 milliLiter(s) IV Continuous <Continuous>  levothyroxine 100 MICROGram(s) Oral daily  magnesium sulfate Infusion 2 Gm/Hr IV Continuous <Continuous>  NIFEdipine XL 30 milliGRAM(s) Oral daily  prenatal multivitamin 1 Tablet(s) Oral daily    PRN Inpatient Medications  ALBUTerol    90 MICROgram(s) HFA Inhaler 2 Puff(s) Inhalation every 6 hours PRN      REVIEW OF SYSTEMS  --------------------------------------------------------------------------------  Gen: No fevers/chills  Skin: No rashes  Head/Eyes/Ears: Normal hearing,   Respiratory: No dyspnea, cough  CV: No chest pain  Abdomen: Gravid, no abdominal pain   GI: Diarrhea  : No dysuria, hematuria  MSK: No  edema  Heme: No easy bruising or bleeding  Psych: No significant depression    All other systems were reviewed and are negative, except as noted.    VITALS/PHYSICAL EXAM  --------------------------------------------------------------------------------  T(C): 36.8 (21 @ 08:54), Max: 37.2 (21 @ 13:58)  HR: 86 (21 @ 09:20) (60 - 148)  BP: 141/90 (21 @ 08:54) (111/64 - 173/86)  RR: 18 (21 @ 08:54) (16 - 18)  SpO2: 99% (21 @ 09:25) (93% - 99%)  Wt(kg): --  Height (cm): 157.5 (21 @ 16:58)  Weight (kg): 85.3 (21 @ 16:58)  BMI (kg/m2): 34.4 (21 @ 16:58)  BSA (m2): 1.86 (21 @ 16:58)      21 @ 07:01  -  21 @ 07:00  --------------------------------------------------------  IN: 1450 mL / OUT: 3000 mL / NET: -1550 mL      Physical Exam:  	Gen: NAD  	HEENT: MMM  	Pulm: CTA B/L  	CV: S1S2  	Abd: Gravid  	Ext: No LE edema B/L  	Neuro: Awake  	Skin: Warm and dry              : no tenderness to palpation               Psych: normal affect and mood    LABS/STUDIES  --------------------------------------------------------------------------------              13.3   14.69 >-----------<  191      [21 @ 06:11]              38.8     129  |  100  |  8   ----------------------------<  121      [21 @ 06:11]  4.1   |  15  |  0.67        Ca     7.5     [21 @ 06:11]      Mg     6.50     [21 06:11]    TPro  6.9  /  Alb  3.8  /  TBili  0.3  /  DBili  x   /  AST  18  /  ALT  20  /  AlkPhos  105  [21 @ 06:11]    PT/INR: PT 10.4 , INR 0.91       [21 @ 06:11]  PTT: 26.2       [21 @ 06:11]    Uric acid 7.0      [21 @ 06:11]        [21 @ 06:11]    Creatinine Trend:  SCr 0.67 [ @ 06:11]  SCr 0.80 [ @ 15:09]    Urinalysis - [21 @ 15:09]      Color Yellow / Appearance Clear / SG 1.020 / pH 6.0      Gluc Negative / Ketone Negative  / Bili Negative / Urobili <2 mg/dL       Blood Small / Protein Trace / Leuk Est Negative / Nitrite Negative      RBC 2 / WBC 3 / Hyaline 2 / Gran  / Sq Epi  / Non Sq Epi 1 / Bacteria Negative    Urine Creatinine 156      [21 @ 15:09]  Urine Protein 28      [21 @ 15:09]        Syphilis Screen (Treponema Pallidum Ab) Negative      [21 @ 02:32]

## 2021-09-02 NOTE — PROGRESS NOTE ADULT - ATTENDING COMMENTS
MERLE late entry  Pt seen and counseled by me 9/2  Agree with 34 week delivery for PEC with severe features  Notify NICU re date/time scheduled

## 2021-09-02 NOTE — PROGRESS NOTE ADULT - SUBJECTIVE AND OBJECTIVE BOX
R3 Antepartum Note, HD#2    Interval events: 1 elevated BP overnight (173/86) at 8p, but repeats all within mild range.     Patient seen and examined at bedside. Reports mild headache overnight (1/10); states she wouldn't have noticed it outside of the hospital. Currently feels well. Pt reports +FM, denies LOF, VB, Ctx, HA, epigastric pain, blurred vision, CP, SOB, N/V, fevers, and chills.    Vital Signs Last 24 Hours  T(C): 36.9 (09-02-21 @ 05:32), Max: 37.2 (09-01-21 @ 13:58)  HR: 83 (09-02-21 @ 07:20) (60 - 148)  BP: 129/87 (09-02-21 @ 06:34) (111/64 - 173/86)  RR: 16 (09-02-21 @ 05:32) (16 - 18)  SpO2: 95% (09-02-21 @ 07:15) (93% - 98%)      Physical Exam:  General: NAD  Abdomen: Soft, non-tender, gravid  Ext: No pain or swelling    NST reactive overnight    Labs:             13.3   14.69 )-----------( 191      ( 09-02 @ 06:11 )             38.8     09-02 @ 06:11    129  |  100  |  8   ----------------------------<  121  4.1   |  15  |  0.67    Ca    7.5      09-02 @ 06:11  Mg     6.50     09-02 @ 06:11    TPro  6.9  /  Alb  3.8  /  TBili  0.3  /  DBili  x   /  AST  18  /  ALT  20  /  AlkPhos  105  09-02 @ 06:11    PT/INR - ( 09-02 @ 06:11 )   PT: 10.4 sec;   INR: 0.91 ratio    PTT - ( 09-02 @ 06:11 )  PTT:26.2 sec    Uric Acid: (09-02 @ 06:11)  7.0      Fibrinogen: (09-02 @ 06:11)  643      LDH: (09-02 @ 06:11)  220        MEDICATIONS  (STANDING):  betamethasone Injectable 12 milliGRAM(s) IntraMuscular every 24 hours  buDESOnide   90 MICROgram(s) Inhaler 2 Puff(s) Inhalation two times a day  heparin   Injectable 5000 Unit(s) SubCutaneous every 12 hours  influenza   Vaccine 0.5 milliLiter(s) IntraMuscular once  lactated ringers. 1000 milliLiter(s) (50 mL/Hr) IV Continuous <Continuous>  levothyroxine 100 MICROGram(s) Oral daily  magnesium sulfate Infusion 2 Gm/Hr (50 mL/Hr) IV Continuous <Continuous>  NIFEdipine XL 30 milliGRAM(s) Oral daily  prenatal multivitamin 1 Tablet(s) Oral daily    MEDICATIONS  (PRN):  ALBUTerol    90 MICROgram(s) HFA Inhaler 2 Puff(s) Inhalation every 6 hours PRN Shortness of Breath and/or Wheezing

## 2021-09-02 NOTE — CONSULT NOTE ADULT - PROBLEM SELECTOR RECOMMENDATION 9
Patient was diagnosed with PKD in high school on ultrasound. She follows with Dr. Romero at [place] and gets renal ultrasound every 6 months. Last ultrasound on 2/22/21 showed R kidney at 12cm, mini complex cyst at 1.8 cm on right kidney and L kidney at 11.6 with 1 simple cyst.   This morning /79, Cr and BUN wnl, urine prot/cr ratio at 0.2-     Agree with nifedipine at this time, monitor BP. No need for imaging of kidney at this time. No further inpatient intervention from Nephrology standpoint.  We will sign off at this time. Please make sure patient follows with primary nephrologist.

## 2021-09-03 ENCOUNTER — APPOINTMENT (OUTPATIENT)
Dept: ANTEPARTUM | Facility: HOSPITAL | Age: 41
End: 2021-09-03

## 2021-09-03 ENCOUNTER — RESULT REVIEW (OUTPATIENT)
Age: 41
End: 2021-09-03

## 2021-09-03 ENCOUNTER — APPOINTMENT (OUTPATIENT)
Dept: PEDIATRIC CARDIOLOGY | Facility: CLINIC | Age: 41
End: 2021-09-03

## 2021-09-03 LAB
ALBUMIN SERPL ELPH-MCNC: 4.1 G/DL — SIGNIFICANT CHANGE UP (ref 3.3–5)
ALP SERPL-CCNC: 114 U/L — SIGNIFICANT CHANGE UP (ref 40–120)
ALT FLD-CCNC: 22 U/L — SIGNIFICANT CHANGE UP (ref 4–33)
ANION GAP SERPL CALC-SCNC: 15 MMOL/L — HIGH (ref 7–14)
APTT BLD: 25.8 SEC — LOW (ref 27–36.3)
AST SERPL-CCNC: 19 U/L — SIGNIFICANT CHANGE UP (ref 4–32)
BASOPHILS # BLD AUTO: 0.02 K/UL — SIGNIFICANT CHANGE UP (ref 0–0.2)
BASOPHILS NFR BLD AUTO: 0.2 % — SIGNIFICANT CHANGE UP (ref 0–2)
BILIRUB SERPL-MCNC: 0.3 MG/DL — SIGNIFICANT CHANGE UP (ref 0.2–1.2)
BUN SERPL-MCNC: 16 MG/DL — SIGNIFICANT CHANGE UP (ref 7–23)
CALCIUM SERPL-MCNC: 8.2 MG/DL — LOW (ref 8.4–10.5)
CHLORIDE SERPL-SCNC: 98 MMOL/L — SIGNIFICANT CHANGE UP (ref 98–107)
CO2 SERPL-SCNC: 17 MMOL/L — LOW (ref 22–31)
COLLECT DURATION TIME UR: 24 HR — SIGNIFICANT CHANGE UP
CREAT SERPL-MCNC: 0.74 MG/DL — SIGNIFICANT CHANGE UP (ref 0.5–1.3)
EOSINOPHIL # BLD AUTO: 0 K/UL — SIGNIFICANT CHANGE UP (ref 0–0.5)
EOSINOPHIL NFR BLD AUTO: 0 % — SIGNIFICANT CHANGE UP (ref 0–6)
FIBRINOGEN PPP-MCNC: 639 MG/DL — HIGH (ref 290–520)
GLUCOSE SERPL-MCNC: 140 MG/DL — HIGH (ref 70–99)
HCT VFR BLD CALC: 39.2 % — SIGNIFICANT CHANGE UP (ref 34.5–45)
HGB BLD-MCNC: 13.4 G/DL — SIGNIFICANT CHANGE UP (ref 11.5–15.5)
IANC: 10.96 K/UL — HIGH (ref 1.5–8.5)
IMM GRANULOCYTES NFR BLD AUTO: 1.6 % — HIGH (ref 0–1.5)
INR BLD: 0.91 RATIO — SIGNIFICANT CHANGE UP (ref 0.88–1.16)
LDH SERPL L TO P-CCNC: 220 U/L — SIGNIFICANT CHANGE UP (ref 135–225)
LYMPHOCYTES # BLD AUTO: 1.22 K/UL — SIGNIFICANT CHANGE UP (ref 1–3.3)
LYMPHOCYTES # BLD AUTO: 9.5 % — LOW (ref 13–44)
MAGNESIUM SERPL-MCNC: 5.8 MG/DL — HIGH (ref 1.6–2.6)
MCHC RBC-ENTMCNC: 31.1 PG — SIGNIFICANT CHANGE UP (ref 27–34)
MCHC RBC-ENTMCNC: 34.2 GM/DL — SIGNIFICANT CHANGE UP (ref 32–36)
MCV RBC AUTO: 91 FL — SIGNIFICANT CHANGE UP (ref 80–100)
MONOCYTES # BLD AUTO: 0.49 K/UL — SIGNIFICANT CHANGE UP (ref 0–0.9)
MONOCYTES NFR BLD AUTO: 3.8 % — SIGNIFICANT CHANGE UP (ref 2–14)
NEUTROPHILS # BLD AUTO: 10.96 K/UL — HIGH (ref 1.8–7.4)
NEUTROPHILS NFR BLD AUTO: 84.9 % — HIGH (ref 43–77)
NRBC # BLD: 0 /100 WBCS — SIGNIFICANT CHANGE UP
NRBC # FLD: 0 K/UL — SIGNIFICANT CHANGE UP
PLATELET # BLD AUTO: 206 K/UL — SIGNIFICANT CHANGE UP (ref 150–400)
POTASSIUM SERPL-MCNC: 4.6 MMOL/L — SIGNIFICANT CHANGE UP (ref 3.5–5.3)
POTASSIUM SERPL-SCNC: 4.6 MMOL/L — SIGNIFICANT CHANGE UP (ref 3.5–5.3)
PROT 24H UR-MRATE: 336 MG/24 H — HIGH
PROT SERPL-MCNC: 7.3 G/DL — SIGNIFICANT CHANGE UP (ref 6–8.3)
PROTHROM AB SERPL-ACNC: 10.5 SEC — LOW (ref 10.6–13.6)
RBC # BLD: 4.31 M/UL — SIGNIFICANT CHANGE UP (ref 3.8–5.2)
RBC # FLD: 13.2 % — SIGNIFICANT CHANGE UP (ref 10.3–14.5)
SODIUM SERPL-SCNC: 130 MMOL/L — LOW (ref 135–145)
TOTAL VOLUME - 24 HOUR: 3200 ML — SIGNIFICANT CHANGE UP
URATE SERPL-MCNC: 5.9 MG/DL — SIGNIFICANT CHANGE UP (ref 2.5–7)
URINE CREATININE CALCULATION: 1.5 G/24 H — SIGNIFICANT CHANGE UP (ref 0.6–1.6)
WBC # BLD: 12.89 K/UL — HIGH (ref 3.8–10.5)
WBC # FLD AUTO: 12.89 K/UL — HIGH (ref 3.8–10.5)

## 2021-09-03 PROCEDURE — 88307 TISSUE EXAM BY PATHOLOGIST: CPT | Mod: 26

## 2021-09-03 PROCEDURE — 59515 CESAREAN DELIVERY: CPT

## 2021-09-03 RX ORDER — OXYCODONE HYDROCHLORIDE 5 MG/1
5 TABLET ORAL
Refills: 0 | Status: COMPLETED | OUTPATIENT
Start: 2021-09-03 | End: 2021-09-10

## 2021-09-03 RX ORDER — SODIUM CHLORIDE 9 MG/ML
1000 INJECTION, SOLUTION INTRAVENOUS
Refills: 0 | Status: DISCONTINUED | OUTPATIENT
Start: 2021-09-03 | End: 2021-09-08

## 2021-09-03 RX ORDER — CITRIC ACID/SODIUM CITRATE 300-500 MG
30 SOLUTION, ORAL ORAL ONCE
Refills: 0 | Status: COMPLETED | OUTPATIENT
Start: 2021-09-03 | End: 2021-09-03

## 2021-09-03 RX ORDER — TETANUS TOXOID, REDUCED DIPHTHERIA TOXOID AND ACELLULAR PERTUSSIS VACCINE, ADSORBED 5; 2.5; 8; 8; 2.5 [IU]/.5ML; [IU]/.5ML; UG/.5ML; UG/.5ML; UG/.5ML
0.5 SUSPENSION INTRAMUSCULAR ONCE
Refills: 0 | Status: DISCONTINUED | OUTPATIENT
Start: 2021-09-03 | End: 2021-09-08

## 2021-09-03 RX ORDER — HEPARIN SODIUM 5000 [USP'U]/ML
5000 INJECTION INTRAVENOUS; SUBCUTANEOUS EVERY 12 HOURS
Refills: 0 | Status: DISCONTINUED | OUTPATIENT
Start: 2021-09-03 | End: 2021-09-03

## 2021-09-03 RX ORDER — MAGNESIUM SULFATE 500 MG/ML
4 VIAL (ML) INJECTION ONCE
Refills: 0 | Status: COMPLETED | OUTPATIENT
Start: 2021-09-03 | End: 2021-09-03

## 2021-09-03 RX ORDER — MAGNESIUM HYDROXIDE 400 MG/1
30 TABLET, CHEWABLE ORAL
Refills: 0 | Status: DISCONTINUED | OUTPATIENT
Start: 2021-09-03 | End: 2021-09-08

## 2021-09-03 RX ORDER — KETOROLAC TROMETHAMINE 30 MG/ML
30 SYRINGE (ML) INJECTION EVERY 6 HOURS
Refills: 0 | Status: DISCONTINUED | OUTPATIENT
Start: 2021-09-03 | End: 2021-09-03

## 2021-09-03 RX ORDER — HEPARIN SODIUM 5000 [USP'U]/ML
5000 INJECTION INTRAVENOUS; SUBCUTANEOUS EVERY 12 HOURS
Refills: 0 | Status: DISCONTINUED | OUTPATIENT
Start: 2021-09-03 | End: 2021-09-08

## 2021-09-03 RX ORDER — ACETAMINOPHEN 500 MG
975 TABLET ORAL
Refills: 0 | Status: DISCONTINUED | OUTPATIENT
Start: 2021-09-03 | End: 2021-09-03

## 2021-09-03 RX ORDER — OXYCODONE HYDROCHLORIDE 5 MG/1
5 TABLET ORAL ONCE
Refills: 0 | Status: DISCONTINUED | OUTPATIENT
Start: 2021-09-03 | End: 2021-09-08

## 2021-09-03 RX ORDER — SODIUM CHLORIDE 9 MG/ML
1000 INJECTION, SOLUTION INTRAVENOUS ONCE
Refills: 0 | Status: DISCONTINUED | OUTPATIENT
Start: 2021-09-03 | End: 2021-09-08

## 2021-09-03 RX ORDER — ACETAMINOPHEN 500 MG
1000 TABLET ORAL ONCE
Refills: 0 | Status: COMPLETED | OUTPATIENT
Start: 2021-09-03 | End: 2021-09-03

## 2021-09-03 RX ORDER — OXYTOCIN 10 UNIT/ML
16.67 VIAL (ML) INJECTION
Qty: 20 | Refills: 0 | Status: DISCONTINUED | OUTPATIENT
Start: 2021-09-03 | End: 2021-09-08

## 2021-09-03 RX ORDER — KETOROLAC TROMETHAMINE 30 MG/ML
30 SYRINGE (ML) INJECTION EVERY 6 HOURS
Refills: 0 | Status: DISCONTINUED | OUTPATIENT
Start: 2021-09-03 | End: 2021-09-04

## 2021-09-03 RX ORDER — SODIUM CHLORIDE 9 MG/ML
1000 INJECTION, SOLUTION INTRAVENOUS
Refills: 0 | Status: DISCONTINUED | OUTPATIENT
Start: 2021-09-03 | End: 2021-09-03

## 2021-09-03 RX ORDER — OXYTOCIN 10 UNIT/ML
333.33 VIAL (ML) INJECTION
Qty: 20 | Refills: 0 | Status: DISCONTINUED | OUTPATIENT
Start: 2021-09-03 | End: 2021-09-03

## 2021-09-03 RX ORDER — IBUPROFEN 200 MG
600 TABLET ORAL EVERY 6 HOURS
Refills: 0 | Status: COMPLETED | OUTPATIENT
Start: 2021-09-03 | End: 2022-08-02

## 2021-09-03 RX ORDER — ACETAMINOPHEN 500 MG
975 TABLET ORAL
Refills: 0 | Status: DISCONTINUED | OUTPATIENT
Start: 2021-09-03 | End: 2021-09-08

## 2021-09-03 RX ORDER — MAGNESIUM SULFATE 500 MG/ML
2 VIAL (ML) INJECTION
Qty: 40 | Refills: 0 | Status: DISCONTINUED | OUTPATIENT
Start: 2021-09-03 | End: 2021-09-04

## 2021-09-03 RX ORDER — LANOLIN
1 OINTMENT (GRAM) TOPICAL EVERY 6 HOURS
Refills: 0 | Status: DISCONTINUED | OUTPATIENT
Start: 2021-09-03 | End: 2021-09-08

## 2021-09-03 RX ORDER — FAMOTIDINE 10 MG/ML
20 INJECTION INTRAVENOUS ONCE
Refills: 0 | Status: COMPLETED | OUTPATIENT
Start: 2021-09-03 | End: 2021-09-03

## 2021-09-03 RX ORDER — DIPHENHYDRAMINE HCL 50 MG
25 CAPSULE ORAL EVERY 6 HOURS
Refills: 0 | Status: COMPLETED | OUTPATIENT
Start: 2021-09-03 | End: 2022-08-02

## 2021-09-03 RX ORDER — MAGNESIUM SULFATE 500 MG/ML
2 VIAL (ML) INJECTION
Qty: 40 | Refills: 0 | Status: COMPLETED | OUTPATIENT
Start: 2021-09-03 | End: 2021-09-04

## 2021-09-03 RX ORDER — SIMETHICONE 80 MG/1
80 TABLET, CHEWABLE ORAL EVERY 4 HOURS
Refills: 0 | Status: DISCONTINUED | OUTPATIENT
Start: 2021-09-03 | End: 2021-09-08

## 2021-09-03 RX ADMIN — Medication 50 GM/HR: at 19:14

## 2021-09-03 RX ADMIN — Medication 50 GM/HR: at 13:28

## 2021-09-03 RX ADMIN — Medication 50 GM/HR: at 11:11

## 2021-09-03 RX ADMIN — Medication 975 MILLIGRAM(S): at 23:40

## 2021-09-03 RX ADMIN — SODIUM CHLORIDE 50 MILLILITER(S): 9 INJECTION, SOLUTION INTRAVENOUS at 20:19

## 2021-09-03 RX ADMIN — SIMETHICONE 80 MILLIGRAM(S): 80 TABLET, CHEWABLE ORAL at 22:20

## 2021-09-03 RX ADMIN — Medication 50 GM/HR: at 14:30

## 2021-09-03 RX ADMIN — Medication 30 MILLIGRAM(S): at 18:48

## 2021-09-03 RX ADMIN — Medication 30 MILLIGRAM(S): at 20:30

## 2021-09-03 RX ADMIN — Medication 30 MILLILITER(S): at 11:15

## 2021-09-03 RX ADMIN — Medication 975 MILLIGRAM(S): at 22:20

## 2021-09-03 RX ADMIN — Medication 300 GRAM(S): at 09:47

## 2021-09-03 RX ADMIN — Medication 400 MILLIGRAM(S): at 14:07

## 2021-09-03 RX ADMIN — HEPARIN SODIUM 5000 UNIT(S): 5000 INJECTION INTRAVENOUS; SUBCUTANEOUS at 19:29

## 2021-09-03 RX ADMIN — Medication 1000 MILLIUNIT(S)/MIN: at 13:29

## 2021-09-03 RX ADMIN — FAMOTIDINE 20 MILLIGRAM(S): 10 INJECTION INTRAVENOUS at 11:15

## 2021-09-03 RX ADMIN — Medication 50 GM/HR: at 10:07

## 2021-09-03 RX ADMIN — Medication 50 GM/HR: at 20:19

## 2021-09-03 NOTE — OB PROVIDER DELIVERY SUMMARY - NSPROVIDERDELIVERYNOTE_OBGYN_ALL_OB_FT
Primary LTCS for hx of myomectomy and sPEC w/ IUGR (1%), uncomplicated.  Viable female infant, vertex presentation, Apgars 9/9, Wt 1380g, cord gasses sent.   Hysterotomy closed in single layer using Vicryl.   Grossly normal fallopian tubes, uterus, and ovaries.    QBL: 272cc  IVF: 1500cc  UOP: 100cc

## 2021-09-03 NOTE — OB RN INTRAOPERATIVE NOTE - NSSELHIDDEN_OBGYN_ALL_OB_FT
[NS_DeliveryAttending1_OBGYN_ALL_OB_FT:[id number]],[NS_DeliveryAssist1_OBGYN_ALL_OB_FT:[id number]],[NS_DeliveryRN_OBGYN_ALL_OB_FT:[id number]],[NS_CirculateRN2_OBGYN_ALL_OB_FT:[id number]]

## 2021-09-03 NOTE — PROGRESS NOTE ADULT - SUBJECTIVE AND OBJECTIVE BOX
MFM Progress Note: Significant Event     Overnight event: Patient received 1 dose of procardia 10 IR overnight due to sustained severe range BP. This morning, patient again had severe range BP.     41y yo  at 33 weeks 3days admitted with PEC with severe features and IUGR (1%) with elevated S/D ratio on UA dopplers. Additionally, this pregnancy is complicated by a fetal pericardial effusion and patient is followed by peds cardiology. Patient currently feeling well with no headache, vision changes or abdominal pain. She reports normal fetal movement, denies contractions, LOF or vaginal bleeding.     Vital Signs Last 24 Hrs  T(C): 36.8 (03 Sep 2021 09:29), Max: 37.3 (02 Sep 2021 21:36)  T(F): 98.2 (03 Sep 2021 09:29), Max: 99.2 (02 Sep 2021 21:36)  HR: 73 (03 Sep 2021 10:39) (62 - 91)  BP: 139/85 (03 Sep 2021 10:00) (126/79 - 172/90)  BP(mean): --  RR: 18 (03 Sep 2021 10:00) (17 - 19)  SpO2: 98% (03 Sep 2021 10:29) (96% - 99%)    Gen: NAD   Pulm: non labored breathing   CV: RRR   Abd: soft, gravid                           13.4   12.89 )-----------( 206      ( 03 Sep 2021 06:10 )             39.2   09-03    130<L>  |  98  |  16  ----------------------------<  140<H>  4.6   |  17<L>  |  0.74    Ca    8.2<L>      03 Sep 2021 06:10  Mg     6.50     09-02    TPro  7.3  /  Alb  4.1  /  TBili  0.3  /  DBili  x   /  AST  19  /  ALT  22  /  AlkPhos  114

## 2021-09-03 NOTE — OB NEONATOLOGY/PEDIATRICIAN DELIVERY SUMMARY - NSPEDSNEONOTESA_OBGYN_ALL_OB_FT
Attended Delivery Note (Neonatology):  Requested to attend CS delivery due to prematurity.  Mother is a 42 yo  at 33.3 weeks of gestation. Prenatal labs negative/NR/immune. Maternal Blood Type O+, GBS unknown, membrane intact at  delivery.   Maternal PMHx: IVF pregnancy, hx of myomectomy in 2019 at which coded, hypothyroidism on synthroid, depression, polycystic kidney disease, and asthma on pulmicort daily.. Prenatal Care uncomplicated, except for fetal echo with pericardial effusion, R ventricular apical diverticulum/aneurysm, mild RVH.  ROM at  (  hours prior to delivery), clear fluid.  Delivery by  CS, Vertex presentation. Emerged vigorous. Delayed cord clamping for  seconds. Warmed, dried, stimulated and suctioned. APGAR / . Maternal Tmax 'C. EOS   Mother wants breast and bottle feeding, desires HepB vaccine.  Pediatrician  Attended Delivery Note (Neonatology):  Requested to attend CS delivery due to prematurity.  Mother is a 42 yo  at 33.3 weeks of gestation. Prenatal labs negative/NR/immune. Maternal Blood Type O+, GBS unknown, membrane intact at  delivery.   Maternal PMHx: IVF pregnancy, hx of myomectomy in 2019 at which coded, hypothyroidism on synthroid, depression, polycystic kidney disease, and asthma on pulmicort daily.. Prenatal Care uncomplicated, except for fetal echo with pericardial effusion, R ventricular apical diverticulum/aneurysm, mild RVH.  ROM at delivery, clear fluid.  Delivery by  CS, Vertex presentation. Emerged vigorous.  Warmed, dried, stimulated and suctioned. APGAR 9/9 . Mother wants breast and bottle feeding, desires HepB vaccine.  Temperature before leaving OR 36.8 via skin probe.

## 2021-09-03 NOTE — OB RN DELIVERY SUMMARY - NSSELHIDDEN_OBGYN_ALL_OB_FT
[NS_DeliveryAttending1_OBGYN_ALL_OB_FT:[id number]],[NS_DeliveryAssist1_OBGYN_ALL_OB_FT:[id number]],[NS_DeliveryRN_OBGYN_ALL_OB_FT:[id number]],[NS_CirculateRN2_OBGYN_ALL_OB_FT:[id number]]

## 2021-09-03 NOTE — PROGRESS NOTE ADULT - SUBJECTIVE AND OBJECTIVE BOX
R3 Antepartum Note, HD#    Interval events:     Patient seen and examined at bedside, no acute overnight events. No acute complaints. Pt reports +FM, denies LOF, VB, Ctx, HA, epigastric pain, blurred vision, CP, SOB, N/V, fevers, and chills.    Vital Signs Last 24 Hours  T(C): 36.8 (09-03-21 @ 04:50), Max: 37.3 (09-02-21 @ 21:36)  HR: 65 (09-03-21 @ 04:50) (65 - 99)  BP: 126/79 (09-03-21 @ 04:50) (126/79 - 172/90)  RR: 18 (09-03-21 @ 04:50) (17 - 19)  SpO2: 98% (09-03-21 @ 04:50) (94% - 99%)    CAPILLARY BLOOD GLUCOSE          Physical Exam:  General: NAD  Abdomen: Soft, non-tender, gravid  Ext: No pain or swelling    NST reactive overnight    Labs:             13.4   12.89 )-----------( 206      ( 09-03 @ 06:10 )             39.2     09-03 @ 06:10    130  |  98  |  16  ----------------------------<  140  4.6   |  17  |  0.74    Ca    8.2      09-03 @ 06:10  Mg     6.50     09-02 @ 06:11    TPro  7.3  /  Alb  4.1  /  TBili  0.3  /  DBili  x   /  AST  19  /  ALT  22  /  AlkPhos  114  09-03 @ 06:10    PT/INR - ( 09-03 @ 06:10 )   PT: 10.5 sec;   INR: 0.91 ratio    PTT - ( 09-03 @ 06:10 )  PTT:25.8 sec    Uric Acid: (09-03 @ 06:10)  5.9      Fibrinogen: (09-03 @ 06:10)  639      LDH: (09-03 @ 06:10)  220        MEDICATIONS  (STANDING):  buDESOnide   90 MICROgram(s) Inhaler 2 Puff(s) Inhalation two times a day  heparin   Injectable 5000 Unit(s) SubCutaneous every 12 hours  influenza   Vaccine 0.5 milliLiter(s) IntraMuscular once  levothyroxine 100 MICROGram(s) Oral daily  NIFEdipine XL 30 milliGRAM(s) Oral daily  prenatal multivitamin 1 Tablet(s) Oral daily    MEDICATIONS  (PRN):  ALBUTerol    90 MICROgram(s) HFA Inhaler 2 Puff(s) Inhalation every 6 hours PRN Shortness of Breath and/or Wheezing   R3 Antepartum Note, HD#3    Interval events:     Patient seen and examined at bedside, no acute overnight events. No acute complaints. She had one severe range BP last night but reports no symptoms. She received Procardia IR 10 mg and tolerated that well with mild range BP afterwards.  Pt reports +FM, denies LOF, VB, Ctx, HA, epigastric pain, blurred vision, CP, SOB, N/V, fevers, and chills.    Vital Signs Last 24 Hours  T(C): 36.8 (09-03-21 @ 04:50), Max: 37.3 (09-02-21 @ 21:36)  HR: 65 (09-03-21 @ 04:50) (65 - 99)  BP: 126/79 (09-03-21 @ 04:50) (126/79 - 172/90)  RR: 18 (09-03-21 @ 04:50) (17 - 19)  SpO2: 98% (09-03-21 @ 04:50) (94% - 99%)          Physical Exam:  General: NAD  Abdomen: Soft, non-tender, gravid  Ext: No pain or swelling    NST reactive overnight    Labs:             13.4   12.89 )-----------( 206      ( 09-03 @ 06:10 )             39.2     09-03 @ 06:10    130  |  98  |  16  ----------------------------<  140  4.6   |  17  |  0.74    Ca    8.2      09-03 @ 06:10  Mg     6.50     09-02 @ 06:11    TPro  7.3  /  Alb  4.1  /  TBili  0.3  /  DBili  x   /  AST  19  /  ALT  22  /  AlkPhos  114  09-03 @ 06:10    PT/INR - ( 09-03 @ 06:10 )   PT: 10.5 sec;   INR: 0.91 ratio    PTT - ( 09-03 @ 06:10 )  PTT:25.8 sec    Uric Acid: (09-03 @ 06:10)  5.9      Fibrinogen: (09-03 @ 06:10)  639      LDH: (09-03 @ 06:10)  220        MEDICATIONS  (STANDING):  buDESOnide   90 MICROgram(s) Inhaler 2 Puff(s) Inhalation two times a day  heparin   Injectable 5000 Unit(s) SubCutaneous every 12 hours  influenza   Vaccine 0.5 milliLiter(s) IntraMuscular once  levothyroxine 100 MICROGram(s) Oral daily  NIFEdipine XL 30 milliGRAM(s) Oral daily  prenatal multivitamin 1 Tablet(s) Oral daily    MEDICATIONS  (PRN):  ALBUTerol    90 MICROgram(s) HFA Inhaler 2 Puff(s) Inhalation every 6 hours PRN Shortness of Breath and/or Wheezing

## 2021-09-03 NOTE — PROGRESS NOTE ADULT - ASSESSMENT
42 YO  33w3d admitted for sPEC, IUGR, and elevated S/D ratio on Dopplers. She had severe range BP overnight and received Procardia 10 mg IR. Her BP stabilized to mild range afterwards. Her labs are stable and she is   40 YO  33w3d admitted for sPEC, IUGR, and elevated S/D ratio on Dopplers. She had severe range BP overnight and received Procardia 10 mg IR. Her BP stabilized to mild range afterwards. She finished 12 hr Mg and received 2 doses BMZ last night. Her urine P/C resulted as elevated but labs are otherwise WNL. She is clinically stable.    #sPEC  -c/w Procardia 30 XL for BP  -Daily AM HELLP labs  -Monitor BPs  -f/u Peds cards/fetal Echo  -follow renal recs for continued Procardia   -Plan for delivery at 34 wks    -c/w Albuterol, synthroid, pulmicort home medications    [facility] MS4 42 YO  33w3d admitted for sPEC, IUGR, and elevated S/D ratio on Dopplers. She had severe range BP overnight and received Procardia 10 mg IR. Her BP stabilized to mild range afterwards. She finished 12 hr Mg and received 2 doses BMZ last night. Her urine P/C resulted as elevated but labs are otherwise WNL. She is clinically stable.    #sPEC  -c/w Procardia 30 XL for BP  -Daily AM HELLP labs  -Monitor BPs  -f/u Peds cards/fetal Echo  -follow renal recs for continued Procardia   -Plan for delivery at 34 wks    -c/w Albuterol, synthroid, pulmicort home medications    [facility] MS4    Agree with above,     Had another sever pressure range and plan for primary csection for Severe PEC, IUGR    Mayra Narayanan

## 2021-09-03 NOTE — OB RN DELIVERY SUMMARY - NS_SEPSISRSKCALC_OBGYN_ALL_OB_FT
Rupture of membranes must be entered above.   EOS calculated successfully. EOS Risk Factor: 0.5/1000 live births ([facility] national incidence); GA=37w3d; Temp=99; ROM=0; GBS='Unknown'; Antibiotics='No antibiotics or any antibiotics < 2 hrs prior to birth'

## 2021-09-03 NOTE — OB PROVIDER DELIVERY SUMMARY - NSSELHIDDEN_OBGYN_ALL_OB_FT
[NS_DeliveryAttending1_OBGYN_ALL_OB_FT:[id number]],[NS_DeliveryAssist1_OBGYN_ALL_OB_FT:[id number]=]

## 2021-09-03 NOTE — PROGRESS NOTE ADULT - ASSESSMENT
A/P: 41y yo  at 33 weeks 3days admitted with PEC with severe features and IUGR (1%) with elevated S/D ratio on UA dopplers with fetus with a known pericardial effusion, now with worsening blood pressures overnight. Given continued severe range BPs, decision made to initiate magnesium and plan for delivery today. Fetal status reassuring with category 1 tracing, now s/p BMZ. Patient has remained NPO this morning and lovenox was held. NICU and peds cards were made aware of delivery plan for the day. Huddle had with , anesthesia and Ob Dr. Narayanan to discuss timing of procedure. Given hx of myomectomy, patient will be for primary  section.   - Plan for OR today   - Magnesium to remain on until 24 hours postpartum   - Continue BP monitoring and PRN hypertensives on top of procardia 30XL     Carly Hirschberg, MFM Fellow   Seen and discussed with MERLE Jaime Attending

## 2021-09-04 DIAGNOSIS — O14.10 SEVERE PRE-ECLAMPSIA, UNSPECIFIED TRIMESTER: ICD-10-CM

## 2021-09-04 LAB
ALBUMIN SERPL ELPH-MCNC: 3.2 G/DL — LOW (ref 3.3–5)
ALP SERPL-CCNC: 73 U/L — SIGNIFICANT CHANGE UP (ref 40–120)
ALT FLD-CCNC: 14 U/L — SIGNIFICANT CHANGE UP (ref 4–33)
ANION GAP SERPL CALC-SCNC: 11 MMOL/L — SIGNIFICANT CHANGE UP (ref 7–14)
APTT BLD: 22.7 SEC — LOW (ref 27–36.3)
AST SERPL-CCNC: 17 U/L — SIGNIFICANT CHANGE UP (ref 4–32)
BASOPHILS # BLD AUTO: 0.01 K/UL — SIGNIFICANT CHANGE UP (ref 0–0.2)
BASOPHILS NFR BLD AUTO: 0.1 % — SIGNIFICANT CHANGE UP (ref 0–2)
BILIRUB SERPL-MCNC: <0.2 MG/DL — SIGNIFICANT CHANGE UP (ref 0.2–1.2)
BLD GP AB SCN SERPL QL: NEGATIVE — SIGNIFICANT CHANGE UP
BUN SERPL-MCNC: 17 MG/DL — SIGNIFICANT CHANGE UP (ref 7–23)
CALCIUM SERPL-MCNC: 6.7 MG/DL — LOW (ref 8.4–10.5)
CHLORIDE SERPL-SCNC: 98 MMOL/L — SIGNIFICANT CHANGE UP (ref 98–107)
CO2 SERPL-SCNC: 20 MMOL/L — LOW (ref 22–31)
CREAT SERPL-MCNC: 0.89 MG/DL — SIGNIFICANT CHANGE UP (ref 0.5–1.3)
EOSINOPHIL # BLD AUTO: 0.01 K/UL — SIGNIFICANT CHANGE UP (ref 0–0.5)
EOSINOPHIL NFR BLD AUTO: 0.1 % — SIGNIFICANT CHANGE UP (ref 0–6)
FIBRINOGEN PPP-MCNC: 529 MG/DL — HIGH (ref 290–520)
GLUCOSE SERPL-MCNC: 106 MG/DL — HIGH (ref 70–99)
HCT VFR BLD CALC: 31.6 % — LOW (ref 34.5–45)
HGB BLD-MCNC: 10.8 G/DL — LOW (ref 11.5–15.5)
IANC: 10.46 K/UL — HIGH (ref 1.5–8.5)
IMM GRANULOCYTES NFR BLD AUTO: 0.7 % — SIGNIFICANT CHANGE UP (ref 0–1.5)
INR BLD: 0.91 RATIO — SIGNIFICANT CHANGE UP (ref 0.88–1.16)
LDH SERPL L TO P-CCNC: 210 U/L — SIGNIFICANT CHANGE UP (ref 135–225)
LYMPHOCYTES # BLD AUTO: 0.93 K/UL — LOW (ref 1–3.3)
LYMPHOCYTES # BLD AUTO: 7.2 % — LOW (ref 13–44)
MAGNESIUM SERPL-MCNC: 6.5 MG/DL — HIGH (ref 1.6–2.6)
MAGNESIUM SERPL-MCNC: 7.4 MG/DL — CRITICAL HIGH (ref 1.6–2.6)
MCHC RBC-ENTMCNC: 31.5 PG — SIGNIFICANT CHANGE UP (ref 27–34)
MCHC RBC-ENTMCNC: 34.2 GM/DL — SIGNIFICANT CHANGE UP (ref 32–36)
MCV RBC AUTO: 92.1 FL — SIGNIFICANT CHANGE UP (ref 80–100)
MONOCYTES # BLD AUTO: 1.47 K/UL — HIGH (ref 0–0.9)
MONOCYTES NFR BLD AUTO: 11.3 % — SIGNIFICANT CHANGE UP (ref 2–14)
NEUTROPHILS # BLD AUTO: 10.46 K/UL — HIGH (ref 1.8–7.4)
NEUTROPHILS NFR BLD AUTO: 80.6 % — HIGH (ref 43–77)
NRBC # BLD: 0 /100 WBCS — SIGNIFICANT CHANGE UP
NRBC # FLD: 0 K/UL — SIGNIFICANT CHANGE UP
PLATELET # BLD AUTO: 168 K/UL — SIGNIFICANT CHANGE UP (ref 150–400)
POTASSIUM SERPL-MCNC: 4.4 MMOL/L — SIGNIFICANT CHANGE UP (ref 3.5–5.3)
POTASSIUM SERPL-SCNC: 4.4 MMOL/L — SIGNIFICANT CHANGE UP (ref 3.5–5.3)
PROT SERPL-MCNC: 5.8 G/DL — LOW (ref 6–8.3)
PROTHROM AB SERPL-ACNC: 10.4 SEC — LOW (ref 10.6–13.6)
RBC # BLD: 3.43 M/UL — LOW (ref 3.8–5.2)
RBC # FLD: 13.7 % — SIGNIFICANT CHANGE UP (ref 10.3–14.5)
RH IG SCN BLD-IMP: POSITIVE — SIGNIFICANT CHANGE UP
SODIUM SERPL-SCNC: 129 MMOL/L — LOW (ref 135–145)
URATE SERPL-MCNC: 5.9 MG/DL — SIGNIFICANT CHANGE UP (ref 2.5–7)
WBC # BLD: 12.97 K/UL — HIGH (ref 3.8–10.5)
WBC # FLD AUTO: 12.97 K/UL — HIGH (ref 3.8–10.5)

## 2021-09-04 RX ORDER — IBUPROFEN 200 MG
600 TABLET ORAL EVERY 6 HOURS
Refills: 0 | Status: DISCONTINUED | OUTPATIENT
Start: 2021-09-04 | End: 2021-09-08

## 2021-09-04 RX ORDER — MAGNESIUM SULFATE 500 MG/ML
1 VIAL (ML) INJECTION
Qty: 40 | Refills: 0 | Status: DISCONTINUED | OUTPATIENT
Start: 2021-09-04 | End: 2021-09-04

## 2021-09-04 RX ADMIN — Medication 50 GM/HR: at 07:47

## 2021-09-04 RX ADMIN — HEPARIN SODIUM 5000 UNIT(S): 5000 INJECTION INTRAVENOUS; SUBCUTANEOUS at 06:50

## 2021-09-04 RX ADMIN — Medication 600 MILLIGRAM(S): at 22:40

## 2021-09-04 RX ADMIN — Medication 975 MILLIGRAM(S): at 09:14

## 2021-09-04 RX ADMIN — Medication 975 MILLIGRAM(S): at 04:34

## 2021-09-04 RX ADMIN — Medication 30 MILLIGRAM(S): at 00:56

## 2021-09-04 RX ADMIN — Medication 975 MILLIGRAM(S): at 11:05

## 2021-09-04 RX ADMIN — Medication 30 MILLIGRAM(S): at 12:50

## 2021-09-04 RX ADMIN — Medication 50 GM/HR: at 10:33

## 2021-09-04 RX ADMIN — Medication 975 MILLIGRAM(S): at 05:30

## 2021-09-04 RX ADMIN — Medication 600 MILLIGRAM(S): at 21:44

## 2021-09-04 RX ADMIN — Medication 975 MILLIGRAM(S): at 18:23

## 2021-09-04 RX ADMIN — Medication 975 MILLIGRAM(S): at 23:45

## 2021-09-04 RX ADMIN — Medication 30 MILLIGRAM(S): at 06:50

## 2021-09-04 RX ADMIN — HEPARIN SODIUM 5000 UNIT(S): 5000 INJECTION INTRAVENOUS; SUBCUTANEOUS at 18:23

## 2021-09-04 RX ADMIN — Medication 30 MILLIGRAM(S): at 02:00

## 2021-09-04 NOTE — PROGRESS NOTE ADULT - SUBJECTIVE AND OBJECTIVE BOX
Postop Day  1  s/p   C- Section    THERAPY:  [x  ] Spinal /epidural  morphine  was given in OR    Subjective: no major complaints    Pain scale score: at rest _3_, with activity _6_    Sedation Score:	  [x  ] Alert	    [  ] Drowsy        [  ] Arousable	[  ] Asleep	[  ] Unresponsive    Side Effects:	  [ x ] None	     [  ] Nausea        [  ] Pruritus        [  ] Weakness   [  ] Numbness      Objective: ambulates,  back non-tender, gross neuro exam normal    T(C): 36.8 (09-04-21 @ 10:27), Max: 36.9 (09-03-21 @ 20:30)  HR: 74 (09-04-21 @ 10:27) (52 - 78)  BP: 114/70 (09-04-21 @ 10:27) (108/78 - 143/84)  RR: 16 (09-04-21 @ 10:27) (13 - 23)  SpO2: 100% (09-04-21 @ 10:27) (96% - 100%)  Wt(kg): --    ASSESSMENT/ PLAN   [ x ] Continue PRN analgesics  [ x ]Documentation and Verification of current medications     acetaminophen   Tablet .. 975 milliGRAM(s) Oral <User Schedule>  ALBUTerol    90 MICROgram(s) HFA Inhaler 2 Puff(s) Inhalation every 6 hours PRN  buDESOnide   90 MICROgram(s) Inhaler 2 Puff(s) Inhalation two times a day  diphenhydrAMINE 25 milliGRAM(s) Oral every 6 hours PRN  diphtheria/tetanus/pertussis (acellular) Vaccine (ADAcel) 0.5 milliLiter(s) IntraMuscular once  heparin   Injectable 5000 Unit(s) SubCutaneous every 12 hours  ibuprofen  Tablet. 600 milliGRAM(s) Oral every 6 hours  influenza   Vaccine 0.5 milliLiter(s) IntraMuscular once  lactated ringers Bolus 1000 milliLiter(s) IV Bolus once  lactated ringers. 1000 milliLiter(s) IV Continuous <Continuous>  lactated ringers. 1000 milliLiter(s) IV Continuous <Continuous>  lanolin Ointment 1 Application(s) Topical every 6 hours PRN  levothyroxine 100 MICROGram(s) Oral daily  magnesium hydroxide Suspension 30 milliLiter(s) Oral two times a day PRN  magnesium sulfate Infusion 2 Gm/Hr IV Continuous <Continuous>  NIFEdipine XL 30 milliGRAM(s) Oral daily  oxyCODONE    IR 5 milliGRAM(s) Oral every 3 hours PRN  oxyCODONE    IR 5 milliGRAM(s) Oral once PRN  oxytocin Infusion 16.667 milliUNIT(s)/Min IV Continuous <Continuous>  prenatal multivitamin 1 Tablet(s) Oral daily  simethicone 80 milliGRAM(s) Chew every 4 hours PRN      Comments: No anesthesia related complications noticed

## 2021-09-04 NOTE — PROGRESS NOTE ADULT - SUBJECTIVE AND OBJECTIVE BOX
YOUNG ESCALONA  MRN-[id number]  41y    Subjective:  She is ambulating, tolerating diet, lochia wnl, she denies HA, visual changes, N/BV, no epigastric pain.  Pain is well controlled, vb is wnl.    Vital Signs Last 24 Hrs  T(C): 36.8 (04 Sep 2021 10:27), Max: 36.9 (03 Sep 2021 20:30)  T(F): 98.2 (04 Sep 2021 10:27), Max: 98.5 (03 Sep 2021 20:30)  HR: 74 (04 Sep 2021 10:27) (48 - 78)  BP: 114/70 (04 Sep 2021 10:27) (108/78 - 156/94)  BP(mean): 84 (03 Sep 2021 19:30) (82 - 126)  RR: 16 (04 Sep 2021 10:27) (12 - 23)  SpO2: 100% (04 Sep 2021 10:27) (96% - 100%)    I&O's Summary    03 Sep 2021 07:01  -  04 Sep 2021 07:00  --------------------------------------------------------  IN: 3400 mL / OUT: 4137 mL / NET: -737 mL    04 Sep 2021 07:01  -  04 Sep 2021 11:09  --------------------------------------------------------  IN: 0 mL / OUT: 800 mL / NET: -800 mL                              10.8   12.97 )-----------( 168      ( 04 Sep 2021 06:38 )             31.6       Allergies    No Known Allergies    Intolerances        MEDICATIONS  (STANDING):  acetaminophen   Tablet .. 975 milliGRAM(s) Oral <User Schedule>  buDESOnide   90 MICROgram(s) Inhaler 2 Puff(s) Inhalation two times a day  diphtheria/tetanus/pertussis (acellular) Vaccine (ADAcel) 0.5 milliLiter(s) IntraMuscular once  heparin   Injectable 5000 Unit(s) SubCutaneous every 12 hours  ibuprofen  Tablet. 600 milliGRAM(s) Oral every 6 hours  influenza   Vaccine 0.5 milliLiter(s) IntraMuscular once  ketorolac   Injectable 30 milliGRAM(s) IV Push every 6 hours  lactated ringers Bolus 1000 milliLiter(s) IV Bolus once  lactated ringers. 1000 milliLiter(s) (125 mL/Hr) IV Continuous <Continuous>  lactated ringers. 1000 milliLiter(s) (50 mL/Hr) IV Continuous <Continuous>  levothyroxine 100 MICROGram(s) Oral daily  magnesium sulfate Infusion 1 Gm/Hr (25 mL/Hr) IV Continuous <Continuous>  magnesium sulfate Infusion 2 Gm/Hr (50 mL/Hr) IV Continuous <Continuous>  NIFEdipine XL 30 milliGRAM(s) Oral daily  oxytocin Infusion 16.667 milliUNIT(s)/Min (50 mL/Hr) IV Continuous <Continuous>  prenatal multivitamin 1 Tablet(s) Oral daily    MEDICATIONS  (PRN):  ALBUTerol    90 MICROgram(s) HFA Inhaler 2 Puff(s) Inhalation every 6 hours PRN Shortness of Breath and/or Wheezing  diphenhydrAMINE 25 milliGRAM(s) Oral every 6 hours PRN Pruritus  lanolin Ointment 1 Application(s) Topical every 6 hours PRN Sore Nipples  magnesium hydroxide Suspension 30 milliLiter(s) Oral two times a day PRN Constipation  oxyCODONE    IR 5 milliGRAM(s) Oral every 3 hours PRN Moderate to Severe Pain (4-10)  oxyCODONE    IR 5 milliGRAM(s) Oral once PRN Moderate to Severe Pain (4-10)  simethicone 80 milliGRAM(s) Chew every 4 hours PRN Gas      PHYSICAL EXAM:    Extremities: non-tender bilaterally, no edema  Abdomen: soft, nontender, fundus firm, incision clean, dry and intact  Pelvis: deferred

## 2021-09-04 NOTE — PROGRESS NOTE ADULT - PROBLEM SELECTOR PLAN 1
Routine postpartum care  - Continue with pain management  - Increase ambulation  - Continue regular diet  -  f/u POD1 AM CBC  - Incision dressing removed POD1

## 2021-09-04 NOTE — PROGRESS NOTE ADULT - SUBJECTIVE AND OBJECTIVE BOX
Patient seen and examined at bedside, no acute overnight events. No acute complaints, pain well controlled. Patient is ambulating and tolerating regular diet. Has passed flatus, voiding spontaneously. Denies CP, SOB, N/V, HA, blurred vision, epigastric pain.    Vital Signs Last 24 Hours  T(C): 36.5 (09-04-21 @ 04:30), Max: 36.9 (09-03-21 @ 20:30)  HR: 68 (09-04-21 @ 04:30) (48 - 87)  BP: 115/70 (09-04-21 @ 04:30) (108/78 - 164/93)  RR: 18 (09-04-21 @ 04:30) (12 - 23)  SpO2: 97% (09-04-21 @ 04:30) (96% - 100%)    I&O's Summary    02 Sep 2021 07:01  -  03 Sep 2021 07:00  --------------------------------------------------------  IN: 400 mL / OUT: 2725 mL / NET: -2325 mL    03 Sep 2021 07:01  -  04 Sep 2021 04:49  --------------------------------------------------------  IN: 3200 mL / OUT: 3237 mL / NET: -37 mL        Physical Exam:  General: NAD  Cardio: regular rate and rhythm  Pulm: lungs clear to auscultation bilaterally  Abdomen: Soft, non-tender, non-distended, fundus firm  Incision: Pfannenstiel incision CDI, subcuticular suture closure  Pelvic: Lochia wnl, external exam of perineum clean and dry without swelling  Extremities: patellar DTR's 2+    Labs:    Blood Type: O Positive  Antibody Screen: Negative  RPR: Negative               13.4   12.89 )-----------( 206      ( 09-03 @ 06:10 )             39.2                13.3   14.69 )-----------( 191      ( 09-02 @ 06:11 )             38.8                12.4   10.34 )-----------( 177      ( 09-01 @ 15:09 )             35.7         MEDICATIONS  (STANDING):  acetaminophen   Tablet .. 975 milliGRAM(s) Oral <User Schedule>  buDESOnide   90 MICROgram(s) Inhaler 2 Puff(s) Inhalation two times a day  diphtheria/tetanus/pertussis (acellular) Vaccine (ADAcel) 0.5 milliLiter(s) IntraMuscular once  heparin   Injectable 5000 Unit(s) SubCutaneous every 12 hours  ibuprofen  Tablet. 600 milliGRAM(s) Oral every 6 hours  influenza   Vaccine 0.5 milliLiter(s) IntraMuscular once  ketorolac   Injectable 30 milliGRAM(s) IV Push every 6 hours  lactated ringers Bolus 1000 milliLiter(s) IV Bolus once  lactated ringers. 1000 milliLiter(s) (125 mL/Hr) IV Continuous <Continuous>  lactated ringers. 1000 milliLiter(s) (50 mL/Hr) IV Continuous <Continuous>  levothyroxine 100 MICROGram(s) Oral daily  magnesium sulfate Infusion 2 Gm/Hr (50 mL/Hr) IV Continuous <Continuous>  magnesium sulfate Infusion 2 Gm/Hr (50 mL/Hr) IV Continuous <Continuous>  NIFEdipine XL 30 milliGRAM(s) Oral daily  oxytocin Infusion 16.667 milliUNIT(s)/Min (50 mL/Hr) IV Continuous <Continuous>  prenatal multivitamin 1 Tablet(s) Oral daily    MEDICATIONS  (PRN):  ALBUTerol    90 MICROgram(s) HFA Inhaler 2 Puff(s) Inhalation every 6 hours PRN Shortness of Breath and/or Wheezing  diphenhydrAMINE 25 milliGRAM(s) Oral every 6 hours PRN Pruritus  lanolin Ointment 1 Application(s) Topical every 6 hours PRN Sore Nipples  magnesium hydroxide Suspension 30 milliLiter(s) Oral two times a day PRN Constipation  oxyCODONE    IR 5 milliGRAM(s) Oral every 3 hours PRN Moderate to Severe Pain (4-10)  oxyCODONE    IR 5 milliGRAM(s) Oral once PRN Moderate to Severe Pain (4-10)  simethicone 80 milliGRAM(s) Chew every 4 hours PRN Gas

## 2021-09-04 NOTE — PROGRESS NOTE ADULT - ASSESSMENT
40y/o  POD#1 from primary low transverse  section at 33 wks in pregnancy complicated by preeclampsia with severe features, currently on Mg and Procardia 30 qd. Blood pressures within expected range overnight, vitals stable, patient feeling well. Currently in stable condition.

## 2021-09-04 NOTE — PROGRESS NOTE ADULT - PROBLEM SELECTOR PLAN 2
- BP's stable overnight on Mg and Procardia 30 daily  - Continue with Procardia 30 XL qdaily  - d/c Mg at 24 hours postpartum  - Continue to monitor blood pressures    Lilia Adair  PGY-1

## 2021-09-04 NOTE — PROGRESS NOTE ADULT - ASSESSMENT
POD 1 s/p  section at 33+ weeks for previous myomectomy and preeclampsia  C/W Mg until 24 hours complete.  S/P holding Mg because Mg level was too high, can restart at 1 mg/hour until 1 pm  C/W procardia 30 XL  Asthma: c/w home meds  Pain: c/w toradol and po tylenol  SubCut Heparin q 12 hours and SCDs  Regular diet

## 2021-09-05 LAB
ALBUMIN SERPL ELPH-MCNC: 3.5 G/DL — SIGNIFICANT CHANGE UP (ref 3.3–5)
ALP SERPL-CCNC: 87 U/L — SIGNIFICANT CHANGE UP (ref 40–120)
ALT FLD-CCNC: 23 U/L — SIGNIFICANT CHANGE UP (ref 4–33)
ANION GAP SERPL CALC-SCNC: 13 MMOL/L — SIGNIFICANT CHANGE UP (ref 7–14)
APTT BLD: 31.8 SEC — SIGNIFICANT CHANGE UP (ref 27–36.3)
AST SERPL-CCNC: 21 U/L — SIGNIFICANT CHANGE UP (ref 4–32)
BASOPHILS # BLD AUTO: 0.03 K/UL — SIGNIFICANT CHANGE UP (ref 0–0.2)
BASOPHILS NFR BLD AUTO: 0.3 % — SIGNIFICANT CHANGE UP (ref 0–2)
BILIRUB SERPL-MCNC: 0.3 MG/DL — SIGNIFICANT CHANGE UP (ref 0.2–1.2)
BUN SERPL-MCNC: 14 MG/DL — SIGNIFICANT CHANGE UP (ref 7–23)
CALCIUM SERPL-MCNC: 8.6 MG/DL — SIGNIFICANT CHANGE UP (ref 8.4–10.5)
CHLORIDE SERPL-SCNC: 104 MMOL/L — SIGNIFICANT CHANGE UP (ref 98–107)
CO2 SERPL-SCNC: 19 MMOL/L — LOW (ref 22–31)
CREAT SERPL-MCNC: 0.78 MG/DL — SIGNIFICANT CHANGE UP (ref 0.5–1.3)
EOSINOPHIL # BLD AUTO: 0.1 K/UL — SIGNIFICANT CHANGE UP (ref 0–0.5)
EOSINOPHIL NFR BLD AUTO: 0.8 % — SIGNIFICANT CHANGE UP (ref 0–6)
FIBRINOGEN PPP-MCNC: 453 MG/DL — SIGNIFICANT CHANGE UP (ref 290–520)
GLUCOSE SERPL-MCNC: 99 MG/DL — SIGNIFICANT CHANGE UP (ref 70–99)
HCT VFR BLD CALC: 31.9 % — LOW (ref 34.5–45)
HGB BLD-MCNC: 11.1 G/DL — LOW (ref 11.5–15.5)
IANC: 8.83 K/UL — HIGH (ref 1.5–8.5)
IMM GRANULOCYTES NFR BLD AUTO: 0.8 % — SIGNIFICANT CHANGE UP (ref 0–1.5)
INR BLD: <0.9 RATIO — SIGNIFICANT CHANGE UP (ref 0.88–1.16)
LDH SERPL L TO P-CCNC: 233 U/L — HIGH (ref 135–225)
LYMPHOCYTES # BLD AUTO: 1.86 K/UL — SIGNIFICANT CHANGE UP (ref 1–3.3)
LYMPHOCYTES # BLD AUTO: 15.7 % — SIGNIFICANT CHANGE UP (ref 13–44)
MCHC RBC-ENTMCNC: 32.4 PG — SIGNIFICANT CHANGE UP (ref 27–34)
MCHC RBC-ENTMCNC: 34.8 GM/DL — SIGNIFICANT CHANGE UP (ref 32–36)
MCV RBC AUTO: 93 FL — SIGNIFICANT CHANGE UP (ref 80–100)
MONOCYTES # BLD AUTO: 0.93 K/UL — HIGH (ref 0–0.9)
MONOCYTES NFR BLD AUTO: 7.8 % — SIGNIFICANT CHANGE UP (ref 2–14)
NEUTROPHILS # BLD AUTO: 8.83 K/UL — HIGH (ref 1.8–7.4)
NEUTROPHILS NFR BLD AUTO: 74.6 % — SIGNIFICANT CHANGE UP (ref 43–77)
NRBC # BLD: 0 /100 WBCS — SIGNIFICANT CHANGE UP
NRBC # FLD: 0 K/UL — SIGNIFICANT CHANGE UP
PLATELET # BLD AUTO: 148 K/UL — LOW (ref 150–400)
POTASSIUM SERPL-MCNC: 4 MMOL/L — SIGNIFICANT CHANGE UP (ref 3.5–5.3)
POTASSIUM SERPL-SCNC: 4 MMOL/L — SIGNIFICANT CHANGE UP (ref 3.5–5.3)
PROT SERPL-MCNC: 6.4 G/DL — SIGNIFICANT CHANGE UP (ref 6–8.3)
PROTHROM AB SERPL-ACNC: 10.2 SEC — LOW (ref 10.6–13.6)
RBC # BLD: 3.43 M/UL — LOW (ref 3.8–5.2)
RBC # FLD: 14 % — SIGNIFICANT CHANGE UP (ref 10.3–14.5)
SODIUM SERPL-SCNC: 136 MMOL/L — SIGNIFICANT CHANGE UP (ref 135–145)
URATE SERPL-MCNC: 4.5 MG/DL — SIGNIFICANT CHANGE UP (ref 2.5–7)
WBC # BLD: 11.85 K/UL — HIGH (ref 3.8–10.5)
WBC # FLD AUTO: 11.85 K/UL — HIGH (ref 3.8–10.5)

## 2021-09-05 PROCEDURE — 93010 ELECTROCARDIOGRAM REPORT: CPT

## 2021-09-05 RX ORDER — DIPHENHYDRAMINE HCL 50 MG
25 CAPSULE ORAL EVERY 6 HOURS
Refills: 0 | Status: DISCONTINUED | OUTPATIENT
Start: 2021-09-05 | End: 2021-09-08

## 2021-09-05 RX ORDER — HYDRALAZINE HCL 50 MG
10 TABLET ORAL ONCE
Refills: 0 | Status: COMPLETED | OUTPATIENT
Start: 2021-09-05 | End: 2021-09-05

## 2021-09-05 RX ORDER — HYDRALAZINE HCL 50 MG
5 TABLET ORAL ONCE
Refills: 0 | Status: COMPLETED | OUTPATIENT
Start: 2021-09-05 | End: 2021-09-05

## 2021-09-05 RX ORDER — HYDRALAZINE HCL 50 MG
5 TABLET ORAL ONCE
Refills: 0 | Status: DISCONTINUED | OUTPATIENT
Start: 2021-09-05 | End: 2021-09-05

## 2021-09-05 RX ORDER — LABETALOL HCL 100 MG
20 TABLET ORAL ONCE
Refills: 0 | Status: COMPLETED | OUTPATIENT
Start: 2021-09-05 | End: 2021-09-05

## 2021-09-05 RX ORDER — FAMOTIDINE 10 MG/ML
20 INJECTION INTRAVENOUS ONCE
Refills: 0 | Status: COMPLETED | OUTPATIENT
Start: 2021-09-05 | End: 2021-09-05

## 2021-09-05 RX ORDER — NIFEDIPINE 30 MG
60 TABLET, EXTENDED RELEASE 24 HR ORAL DAILY
Refills: 0 | Status: DISCONTINUED | OUTPATIENT
Start: 2021-09-05 | End: 2021-09-06

## 2021-09-05 RX ORDER — OXYCODONE HYDROCHLORIDE 5 MG/1
5 TABLET ORAL
Refills: 0 | Status: DISCONTINUED | OUTPATIENT
Start: 2021-09-05 | End: 2021-09-08

## 2021-09-05 RX ORDER — LABETALOL HCL 100 MG
40 TABLET ORAL ONCE
Refills: 0 | Status: COMPLETED | OUTPATIENT
Start: 2021-09-05 | End: 2021-09-05

## 2021-09-05 RX ADMIN — Medication 975 MILLIGRAM(S): at 21:48

## 2021-09-05 RX ADMIN — Medication 20 MILLIGRAM(S): at 18:22

## 2021-09-05 RX ADMIN — HEPARIN SODIUM 5000 UNIT(S): 5000 INJECTION INTRAVENOUS; SUBCUTANEOUS at 17:40

## 2021-09-05 RX ADMIN — Medication 600 MILLIGRAM(S): at 11:35

## 2021-09-05 RX ADMIN — Medication 600 MILLIGRAM(S): at 19:53

## 2021-09-05 RX ADMIN — Medication 60 MILLIGRAM(S): at 19:20

## 2021-09-05 RX ADMIN — Medication 600 MILLIGRAM(S): at 03:47

## 2021-09-05 RX ADMIN — Medication 975 MILLIGRAM(S): at 21:20

## 2021-09-05 RX ADMIN — HEPARIN SODIUM 5000 UNIT(S): 5000 INJECTION INTRAVENOUS; SUBCUTANEOUS at 06:08

## 2021-09-05 RX ADMIN — SIMETHICONE 80 MILLIGRAM(S): 80 TABLET, CHEWABLE ORAL at 21:20

## 2021-09-05 RX ADMIN — Medication 40 MILLIGRAM(S): at 19:00

## 2021-09-05 RX ADMIN — Medication 975 MILLIGRAM(S): at 18:04

## 2021-09-05 RX ADMIN — Medication 975 MILLIGRAM(S): at 00:45

## 2021-09-05 RX ADMIN — Medication 975 MILLIGRAM(S): at 16:30

## 2021-09-05 RX ADMIN — Medication 600 MILLIGRAM(S): at 10:32

## 2021-09-05 RX ADMIN — FAMOTIDINE 20 MILLIGRAM(S): 10 INJECTION INTRAVENOUS at 22:28

## 2021-09-05 RX ADMIN — Medication 600 MILLIGRAM(S): at 19:16

## 2021-09-05 RX ADMIN — Medication 975 MILLIGRAM(S): at 06:04

## 2021-09-05 RX ADMIN — Medication 975 MILLIGRAM(S): at 07:29

## 2021-09-05 RX ADMIN — Medication 10 MILLIGRAM(S): at 20:29

## 2021-09-05 RX ADMIN — Medication 10 MILLIGRAM(S): at 20:49

## 2021-09-05 NOTE — PROGRESS NOTE ADULT - ASSESSMENT
A/P: 42yo POD#2 s/p LTCS.  Patient is stable and doing well post-operatively.    - Continue regular diet.  - Increase ambulation.  - Continue motrin, tylenol, oxycodone PRN for pain control    #sPEC  - s/p Mag x24 for seizure ppx  - c/w Procardia XL 30 QD  - HELLP labs wnl, repeat PRN  - BP and symptom monitoring - appropriate at this time    #Polycystic kidney disease   - s/p medicine consult   - Cr: 0.74->0.89     #Asthma   - Continue home medications: Symbicort, Pulmicort     TOMY Calderon MD  PGY-1

## 2021-09-05 NOTE — PROGRESS NOTE ADULT - SUBJECTIVE AND OBJECTIVE BOX
OB Progress Note: LTCS, POD#2    S: 40yo POD#2 s/p LTCS at 33w gestation. Pain is well controlled. She is tolerating a regular diet and passing flatus. She is voiding spontaneously, and ambulating without difficulty. Denies CP/SOB. Denies lightheadedness/dizziness. Denies N/V.    O:  Vitals:  Vital Signs Last 24 Hrs  T(C): 37.1 (05 Sep 2021 05:49), Max: 37.2 (05 Sep 2021 01:47)  T(F): 98.7 (05 Sep 2021 05:49), Max: 99 (05 Sep 2021 01:47)  HR: 67 (05 Sep 2021 05:49) (65 - 85)  BP: 145/85 (05 Sep 2021 05:49) (114/70 - 147/82)  BP(mean): --  RR: 17 (05 Sep 2021 05:49) (16 - 18)  SpO2: 99% (05 Sep 2021 05:49) (98% - 100%)    MEDICATIONS  (STANDING):  acetaminophen   Tablet .. 975 milliGRAM(s) Oral <User Schedule>  buDESOnide   90 MICROgram(s) Inhaler 2 Puff(s) Inhalation two times a day  diphtheria/tetanus/pertussis (acellular) Vaccine (ADAcel) 0.5 milliLiter(s) IntraMuscular once  heparin   Injectable 5000 Unit(s) SubCutaneous every 12 hours  ibuprofen  Tablet. 600 milliGRAM(s) Oral every 6 hours  influenza   Vaccine 0.5 milliLiter(s) IntraMuscular once  lactated ringers Bolus 1000 milliLiter(s) IV Bolus once  lactated ringers. 1000 milliLiter(s) (125 mL/Hr) IV Continuous <Continuous>  lactated ringers. 1000 milliLiter(s) (50 mL/Hr) IV Continuous <Continuous>  levothyroxine 100 MICROGram(s) Oral daily  NIFEdipine XL 30 milliGRAM(s) Oral daily  oxytocin Infusion 16.667 milliUNIT(s)/Min (50 mL/Hr) IV Continuous <Continuous>  prenatal multivitamin 1 Tablet(s) Oral daily      MEDICATIONS  (PRN):  ALBUTerol    90 MICROgram(s) HFA Inhaler 2 Puff(s) Inhalation every 6 hours PRN Shortness of Breath and/or Wheezing  diphenhydrAMINE 25 milliGRAM(s) Oral every 6 hours PRN Pruritus  lanolin Ointment 1 Application(s) Topical every 6 hours PRN Sore Nipples  magnesium hydroxide Suspension 30 milliLiter(s) Oral two times a day PRN Constipation  oxyCODONE    IR 5 milliGRAM(s) Oral every 3 hours PRN Moderate to Severe Pain (4-10)  oxyCODONE    IR 5 milliGRAM(s) Oral once PRN Moderate to Severe Pain (4-10)  simethicone 80 milliGRAM(s) Chew every 4 hours PRN Gas      Labs:  Blood type: O Positive  Rubella IgG: RPR: Negative                          10.8<L>   12.97<H> >-----------< 168    ( 09-04 @ 06:38 )             31.6<L>                        13.4   12.89<H> >-----------< 206    ( 09-03 @ 06:10 )             39.2    09-04-21 @ 06:38      129<L>  |  98  |  17  ----------------------------<  106<H>  4.4   |  20<L>  |  0.89    09-03-21 @ 06:10      130<L>  |  98  |  16  ----------------------------<  140<H>  4.6   |  17<L>  |  0.74        Ca    6.7<L>      04 Sep 2021 06:38  Ca    8.2<L>      03 Sep 2021 06:10  Mg     7.40<HH>     09-04  Mg     6.50<H>     09-03  Mg     5.80<H>     09-03    TPro  5.8<L>  /  Alb  3.2<L>  /  TBili  <0.2  /  DBili  x   /  AST  17  /  ALT  14  /  AlkPhos  73  09-04-21 @ 06:38  TPro  7.3  /  Alb  4.1  /  TBili  0.3  /  DBili  x   /  AST  19  /  ALT  22  /  AlkPhos  114  09-03-21 @ 06:10          PE:  General: NAD  Abdomen: Soft, appropriately tender, incision c/d/i.  Extremities: No erythema, no pitting edema

## 2021-09-05 NOTE — PROGRESS NOTE ADULT - SUBJECTIVE AND OBJECTIVE BOX
YOUNG ESCALONA  MRN-[id number]  41y    Subjective:  She feels well. She is ambulating, tolerating diet, lochia. wnl.  She denies headaches, visual changes, epigastric pain.  Her pain is well controlled,  SHe is voiding normally.     Vital Signs Last 24 Hrs  T(C): 36.7 (05 Sep 2021 09:06), Max: 37.2 (05 Sep 2021 01:47)  T(F): 98.1 (05 Sep 2021 09:06), Max: 99 (05 Sep 2021 01:47)  HR: 80 (05 Sep 2021 09:06) (67 - 85)  BP: 141/88 (05 Sep 2021 09:06) (126/77 - 147/82)  BP(mean): --  RR: 16 (05 Sep 2021 09:06) (16 - 18)  SpO2: 100% (05 Sep 2021 09:06) (99% - 100%)    I&O's Summary    04 Sep 2021 07:01  -  05 Sep 2021 07:00  --------------------------------------------------------  IN: 0 mL / OUT: 800 mL / NET: -800 mL                              10.8   12.97 )-----------( 168      ( 04 Sep 2021 06:38 )             31.6       Allergies    No Known Allergies    Intolerances        MEDICATIONS  (STANDING):  acetaminophen   Tablet .. 975 milliGRAM(s) Oral <User Schedule>  buDESOnide   90 MICROgram(s) Inhaler 2 Puff(s) Inhalation two times a day  diphtheria/tetanus/pertussis (acellular) Vaccine (ADAcel) 0.5 milliLiter(s) IntraMuscular once  heparin   Injectable 5000 Unit(s) SubCutaneous every 12 hours  ibuprofen  Tablet. 600 milliGRAM(s) Oral every 6 hours  influenza   Vaccine 0.5 milliLiter(s) IntraMuscular once  lactated ringers Bolus 1000 milliLiter(s) IV Bolus once  lactated ringers. 1000 milliLiter(s) (125 mL/Hr) IV Continuous <Continuous>  lactated ringers. 1000 milliLiter(s) (50 mL/Hr) IV Continuous <Continuous>  levothyroxine 100 MICROGram(s) Oral daily  NIFEdipine XL 30 milliGRAM(s) Oral daily  oxytocin Infusion 16.667 milliUNIT(s)/Min (50 mL/Hr) IV Continuous <Continuous>  prenatal multivitamin 1 Tablet(s) Oral daily    MEDICATIONS  (PRN):  ALBUTerol    90 MICROgram(s) HFA Inhaler 2 Puff(s) Inhalation every 6 hours PRN Shortness of Breath and/or Wheezing  diphenhydrAMINE 25 milliGRAM(s) Oral every 6 hours PRN Pruritus  lanolin Ointment 1 Application(s) Topical every 6 hours PRN Sore Nipples  magnesium hydroxide Suspension 30 milliLiter(s) Oral two times a day PRN Constipation  oxyCODONE    IR 5 milliGRAM(s) Oral every 3 hours PRN Moderate to Severe Pain (4-10)  oxyCODONE    IR 5 milliGRAM(s) Oral once PRN Moderate to Severe Pain (4-10)  simethicone 80 milliGRAM(s) Chew every 4 hours PRN Gas      PHYSICAL EXAM:    Extremities:NTBL, no edema  Abdomen: soft, nontender, fundus firm, incision clean, dry and intact  Pelvis: deferred

## 2021-09-05 NOTE — CHART NOTE - NSCHARTNOTEFT_GEN_A_CORE
R3 Chart Note    Patient seen at bedside for reports of severe range BP as follows:  173/99->193/108->Labetalol 20 IVP->162/86  At bedside patient denies headache, change in vision, SOB, chest pain, RUQ pain, fatigue, fevers, chills.  Reports that she was woken up with the similar "sensation in her chest" as she felt when she had severe range pressures prior to delivery.      VS  T(C): 36.8 (09-05-21 @ 17:59)  HR: 67 (09-05-21 @ 19:14)  BP: 158/92 (09-05-21 @ 19:14)  RR: 18 (09-05-21 @ 19:14)  SpO2: 98% (09-05-21 @ 19:14)      Plan:  -administer Labetalol 40 IVP at this time, will follow up repeat BP  -Procardia dose change to 60 XL qD and start at 8pm tonight  -stat HELLP labs    d/w Dr. Jimmy Godfrey, PGY3
Mom is a 40yo  at 33.1 wks, w/ hx of hypothyroidism on Synthroid, and polycystic kidney disease (gets KATE every 6mo), presenting from [facility] due to fetal cardiac alert: small-to-moderate sized pericardial effusion, likely small right ventricular apical diverticulum seen on . Effusions are somewhat improved compared to prior with no evidence of cardiovascular compromise. Baby has normal karyotype and array. EFW today 1567g (1st percentile) w/ elevated dopplers. Cardiology to be consulted in NICU within first DOL or sooner if needed.     I discussed the following in detail with mom:    1. Risk of respiratory problems is still present, because lungs can be immature. Severity of the problem can range from nothing to the need for CPAP. Risk of respiratory problems decreases with use of BMZ.    2. Thermoregulation issues and the need for isolette.    3. Initially baby might require TPN. Feeding would be initiated once the baby is stable and will slowly progress to full feeds. Benefits of breast milk discussed.    4. Umbilical lines may be placed followed by PICC line for the same if baby requires longer duration of TPN. Infection risk discussed.    5. Need for hypoglycemia monitoring.    6. Antibiotics may be started after birth for presumed sepsis depending on the risk factors at the time.    7. Need for possible phototherapy in setting of jaundice.     8. Delivery room events, NICU environment, infection control.    9. Discharge criteria and anticipated length of stay. Explained that babies can have different clinical courses.    Mom expressed understanding of the issues mentioned. She was given opportunity to ask questions and I answered them to the best of my knowledge.    Tank Wallace MD  NICU Fellow PGY-4

## 2021-09-05 NOTE — PROGRESS NOTE ADULT - ASSESSMENT
POD2, s/p delivery ar 33+ weeks for severe preeclampsia  C/W 30 mf Procardia XL q day  Monitor BPs and for symptoms  Home meds for asthma  DVT PPX: subCut Heparin BID  Regular diet  Ambulation encouraged   PO analgesia  Possible d/c tomorrow if remains stable

## 2021-09-06 PROCEDURE — 99222 1ST HOSP IP/OBS MODERATE 55: CPT

## 2021-09-06 RX ORDER — NIFEDIPINE 30 MG
60 TABLET, EXTENDED RELEASE 24 HR ORAL EVERY 12 HOURS
Refills: 0 | Status: DISCONTINUED | OUTPATIENT
Start: 2021-09-06 | End: 2021-09-08

## 2021-09-06 RX ORDER — ALPRAZOLAM 0.25 MG
0.25 TABLET ORAL EVERY 6 HOURS
Refills: 0 | Status: DISCONTINUED | OUTPATIENT
Start: 2021-09-06 | End: 2021-09-08

## 2021-09-06 RX ORDER — NIFEDIPINE 30 MG
60 TABLET, EXTENDED RELEASE 24 HR ORAL ONCE
Refills: 0 | Status: COMPLETED | OUTPATIENT
Start: 2021-09-06 | End: 2021-09-06

## 2021-09-06 RX ORDER — SERTRALINE 25 MG/1
50 TABLET, FILM COATED ORAL DAILY
Refills: 0 | Status: DISCONTINUED | OUTPATIENT
Start: 2021-09-06 | End: 2021-09-08

## 2021-09-06 RX ORDER — LABETALOL HCL 100 MG
200 TABLET ORAL
Refills: 0 | Status: DISCONTINUED | OUTPATIENT
Start: 2021-09-06 | End: 2021-09-08

## 2021-09-06 RX ADMIN — Medication 600 MILLIGRAM(S): at 00:05

## 2021-09-06 RX ADMIN — Medication 60 MILLIGRAM(S): at 17:50

## 2021-09-06 RX ADMIN — Medication 60 MILLIGRAM(S): at 07:57

## 2021-09-06 RX ADMIN — Medication 600 MILLIGRAM(S): at 00:58

## 2021-09-06 RX ADMIN — Medication 600 MILLIGRAM(S): at 20:49

## 2021-09-06 RX ADMIN — Medication 0.25 MILLIGRAM(S): at 14:18

## 2021-09-06 RX ADMIN — Medication 975 MILLIGRAM(S): at 06:16

## 2021-09-06 RX ADMIN — Medication 600 MILLIGRAM(S): at 19:49

## 2021-09-06 RX ADMIN — Medication 975 MILLIGRAM(S): at 06:56

## 2021-09-06 RX ADMIN — Medication 25 MILLIGRAM(S): at 09:05

## 2021-09-06 RX ADMIN — Medication 600 MILLIGRAM(S): at 13:05

## 2021-09-06 RX ADMIN — Medication 25 MILLIGRAM(S): at 00:05

## 2021-09-06 RX ADMIN — Medication 0.25 MILLIGRAM(S): at 21:10

## 2021-09-06 RX ADMIN — Medication 600 MILLIGRAM(S): at 14:18

## 2021-09-06 RX ADMIN — HEPARIN SODIUM 5000 UNIT(S): 5000 INJECTION INTRAVENOUS; SUBCUTANEOUS at 17:49

## 2021-09-06 RX ADMIN — Medication 200 MILLIGRAM(S): at 22:39

## 2021-09-06 RX ADMIN — HEPARIN SODIUM 5000 UNIT(S): 5000 INJECTION INTRAVENOUS; SUBCUTANEOUS at 06:16

## 2021-09-06 RX ADMIN — SERTRALINE 50 MILLIGRAM(S): 25 TABLET, FILM COATED ORAL at 13:05

## 2021-09-06 NOTE — PROGRESS NOTE ADULT - ASSESSMENT
A/P: 42yo  POD#3 s/p LTCS.  Patient is stable with uncontrolled BP's   - Continue motrin, tylenol, oxycodone PRN for pain control.  - Increase ambulation  - Continue regular diet    #sPEC  - Blood pressures uncontrolled overnight. Pt received Labetalol 20mg and 40mg IV pushes along with 2 pushes of Hydralazine 10 overnight.   - c/w Procardia XL 60 QD - will discuss increasing to BID today   - EKG preformed overnight - WNL   - s/p Mag x24 for seizure ppx  - HELLP labs wnl, repeat PRN  - Continue BP and symptom monitoring    #Asthma   - Pt takes Pulmicort daily at home and inpatient   - Pt states that she rarely  (before exercise or with a cold) uses rescue inhaler - no use while inpatient.   - Pulls over 2L on KIMBERLY Calderon MD  PGY-1

## 2021-09-06 NOTE — PROVIDER CONTACT NOTE (OTHER) - BACKGROUND
s/p c section 9/3/21 secondary to PEC
 9/3 @33.3wks, SPEC. Pt had recurrent severe range BP and multiple pushes of BP meds (see flowsheet and med rec).
S/P  3 for PEC

## 2021-09-06 NOTE — PROVIDER CONTACT NOTE (OTHER) - SITUATION
176/99, HR 67, RR 18, O2Sat 100% RA  Pt states she "Feels her heart beating very fast and heavy"
Repeat /103 HR 75 @7:15  151/98 HR 76 @ 07:30
Follow-up with MD regarding 2pm BP of 152/97.
reports by pt of "Palpitations and warm sensation in her chest". Pt had recurrent severe range blood pressures and multiple pushes of blood pressure medications through the day and evening.

## 2021-09-06 NOTE — PROVIDER CONTACT NOTE (OTHER) - ASSESSMENT
Denies HA, visual changes, RUQ pain, N/V
Lungs clear, Vitals stable, normal range HR. Mild incisional pain.
Pt AOx4, all other VSS
Pt denies HA, visual changes, RUQ pain, SOB, N/V

## 2021-09-06 NOTE — PROVIDER CONTACT NOTE (OTHER) - REASON
Elevated BP follow-up
elevated BP
BP
reports by pt of "Palpitations and warm sensation in her chest"

## 2021-09-06 NOTE — CONSULT NOTE ADULT - ATTENDING COMMENTS
Agree with findings and plans above.
41 year old pregnant woman with polycystic kidney disease admitted with pre eclampsia s/p Magnesium gtt IV. BP now controlled while on procardia. Patient is without symptoms currently and all labs concerning for HELLP are currently normal.     Patient has normal renal function and blood pressure, minimal proteinuria. Would continue current medications. She should be monitored with HELLP labs 1-2 times a week depending on when she delivers. Please call us if there are any changes in her labs/renal function/blood pressure.    Patient should have close follow up with her nephrologist Dr. Romero at [place].  Will sign off for now, thank you for this consult.

## 2021-09-06 NOTE — CONSULT NOTE ADULT - SUBJECTIVE AND OBJECTIVE BOX
All Cardiology service information can be found  on [email or web address], password: [email or web address]    Patient seen and evaluated at bedside    Chief Complaint:    HPI:  40 yo , asthma (well controlled), hypothyroid, polycystic kidney disease (no history of HTN) now POD 4 s/p uncomplicated C section c/b ongoing HTN. Reportedly no issues w/ HTN, at baseline 110-120s SBP, however at clinic prior to referral to ED noted to have /90 x 2, largely asx. Following delivery has had ongoing issues with uncontrolled BPs, no evidence of end organ dysfunction, occasional complaints of chest / neck tightness and feeling as though her heart is beating forcefully during episodes of particularly high BPs. Overnight patient received Labetalol 20mg and 40mg IV pushes along with 2 pushes of Hydralazine 10. Currently on procardia 60mg daily. On my assessment patient is well compensated and largely asymptomatic.     PMHx:   H/O adult polycystic kidney disease  Hypothyroid  Asthma    PSHx:   H/O: myomectomy    Allergies:  NKDA     Current Medications:   acetaminophen   Tablet .. 975 milliGRAM(s) Oral <User Schedule>  ALBUTerol    90 MICROgram(s) HFA Inhaler 2 Puff(s) Inhalation every 6 hours PRN  buDESOnide   90 MICROgram(s) Inhaler 2 Puff(s) Inhalation two times a day  diphenhydrAMINE 25 milliGRAM(s) Oral every 6 hours PRN  diphtheria/tetanus/pertussis (acellular) Vaccine (ADAcel) 0.5 milliLiter(s) IntraMuscular once  heparin   Injectable 5000 Unit(s) SubCutaneous every 12 hours  ibuprofen  Tablet. 600 milliGRAM(s) Oral every 6 hours  influenza   Vaccine 0.5 milliLiter(s) IntraMuscular once  lactated ringers Bolus 1000 milliLiter(s) IV Bolus once  lactated ringers. 1000 milliLiter(s) IV Continuous <Continuous>  lactated ringers. 1000 milliLiter(s) IV Continuous <Continuous>  lanolin Ointment 1 Application(s) Topical every 6 hours PRN  levothyroxine 100 MICROGram(s) Oral daily  magnesium hydroxide Suspension 30 milliLiter(s) Oral two times a day PRN  NIFEdipine XL 60 milliGRAM(s) Oral daily  oxyCODONE    IR 5 milliGRAM(s) Oral once PRN  oxyCODONE    IR 5 milliGRAM(s) Oral every 3 hours PRN  oxytocin Infusion 16.667 milliUNIT(s)/Min IV Continuous <Continuous>  prenatal multivitamin 1 Tablet(s) Oral daily  simethicone 80 milliGRAM(s) Chew every 4 hours PRN    FAMILY HISTORY: non contributory     Social History: no toxic habits     REVIEW OF SYSTEMS:  CONSTITUTIONAL: No weakness, fevers or chills  EYES/ENT: No visual changes;  No dysphagia  NECK: No pain or stiffness  RESPIRATORY: No cough, wheezing, hemoptysis; No shortness of breath  CARDIOVASCULAR: No lower extremity edema  GASTROINTESTINAL: No abdominal. No hematemesis; No diarrhea or constipation. No melena or hematochezia.  BACK: No back pain  GENITOURINARY: No dysuria, frequency or hematuria  NEUROLOGICAL: No numbness or weakness  SKIN: No itching, burning, rashes, or lesions   All other review of systems is negative unless indicated above.    Physical Exam:  T(F): 98.3 (), Max: 98.7 ()  HR: 75 () (60 - 87)  BP: 151/90 () (126/83 - 193/108)  RR: 18 ()  SpO2: 100% ()  GENERAL: No acute distress, well-developed  HEAD:  Atraumatic, Normocephalic  ENT: EOMI, PERRLA, conjunctiva and sclera clear, Neck supple, No JVD, moist mucosa  CHEST/LUNG: Clear to auscultation bilaterally; No wheeze, equal breath sounds bilaterally   BACK: No spinal tenderness  HEART: Regular rate and rhythm; No murmurs, rubs, or gallops  ABDOMEN: Soft, Nontender, Nondistended; Bowel sounds present  EXTREMITIES:  No clubbing, cyanosis, or edema  PSYCH: Nl behavior, nl affect  NEUROLOGY: AAOx3, non-focal, cranial nerves intact  SKIN: Normal color, No rashes or lesions  LINES:    Cardiovascular Diagnostic Testing:    ECG: Personally reviewed:    Echo: Personally reviewed:    Stress Testing:    Cath:    Imaging:    CXR: Personally reviewed    Labs: Personally reviewed                         )-----------( 148      ( 05 Sep 2021 20:03 )             31.9     09-05    136  |  104  |  14  ----------------------------<  99  4.0   |  19<L>  |  0.78    Ca    8.6      05 Sep 2021 20:03    TPro  6.4  /  Alb  3.5  /  TBili  0.3  /  DBili  x   /  AST  21  /  ALT  23  /  AlkPhos  87  09-05    PT/INR - ( 05 Sep 2021 20:03 )   PT: 10.2 sec;   INR: <0.90 ratio         PTT - ( 05 Sep 2021 20:03 )  PTT:31.8 sec                     All Cardiology service information can be found  on [email or web address], password: [email or web address]    Patient seen and evaluated at bedside    Chief Complaint:    HPI:  40 yo , asthma (well controlled), hypothyroid, polycystic kidney disease (no history of HTN) now POD 4 s/p uncomplicated C section c/b ongoing HTN. Reportedly no issues w/ HTN, at baseline 110-120s SBP, however at clinic prior to referral to ED noted to have /90 x 2, largely asx. Following delivery has had ongoing issues with uncontrolled BPs, no evidence of end organ dysfunction, occasional complaints of chest / neck tightness and feeling as though her heart is beating forcefully during episodes of particularly high BPs. Overnight patient received Labetalol 20mg and 40mg IV pushes along with 2 pushes of Hydralazine 10. Currently on procardia 60mg daily. On my assessment patient is well compensated and largely asymptomatic.     PMHx:   H/O adult polycystic kidney disease  Hypothyroid  Asthma    PSHx:   H/O: myomectomy    Allergies:  NKDA     Current Medications:   acetaminophen   Tablet .. 975 milliGRAM(s) Oral <User Schedule>  ALBUTerol    90 MICROgram(s) HFA Inhaler 2 Puff(s) Inhalation every 6 hours PRN  buDESOnide   90 MICROgram(s) Inhaler 2 Puff(s) Inhalation two times a day  diphenhydrAMINE 25 milliGRAM(s) Oral every 6 hours PRN  diphtheria/tetanus/pertussis (acellular) Vaccine (ADAcel) 0.5 milliLiter(s) IntraMuscular once  heparin   Injectable 5000 Unit(s) SubCutaneous every 12 hours  ibuprofen  Tablet. 600 milliGRAM(s) Oral every 6 hours  influenza   Vaccine 0.5 milliLiter(s) IntraMuscular once  lactated ringers Bolus 1000 milliLiter(s) IV Bolus once  lactated ringers. 1000 milliLiter(s) IV Continuous <Continuous>  lactated ringers. 1000 milliLiter(s) IV Continuous <Continuous>  lanolin Ointment 1 Application(s) Topical every 6 hours PRN  levothyroxine 100 MICROGram(s) Oral daily  magnesium hydroxide Suspension 30 milliLiter(s) Oral two times a day PRN  NIFEdipine XL 60 milliGRAM(s) Oral daily  oxyCODONE    IR 5 milliGRAM(s) Oral once PRN  oxyCODONE    IR 5 milliGRAM(s) Oral every 3 hours PRN  oxytocin Infusion 16.667 milliUNIT(s)/Min IV Continuous <Continuous>  prenatal multivitamin 1 Tablet(s) Oral daily  simethicone 80 milliGRAM(s) Chew every 4 hours PRN    FAMILY HISTORY: non contributory     Social History: no toxic habits     REVIEW OF SYSTEMS:  CONSTITUTIONAL: No weakness, fevers or chills  EYES/ENT: No visual changes;  No dysphagia  NECK: No pain or stiffness  RESPIRATORY: No cough, wheezing, hemoptysis; No shortness of breath  CARDIOVASCULAR: No lower extremity edema  GASTROINTESTINAL: No abdominal. No hematemesis; No diarrhea or constipation. No melena or hematochezia.  BACK: No back pain  GENITOURINARY: No dysuria, frequency or hematuria  NEUROLOGICAL: No numbness or weakness  SKIN: No itching, burning, rashes, or lesions   All other review of systems is negative unless indicated above.    Physical Exam:  T(F): 98.3 (), Max: 98.7 ()  HR: 75 () (60 - 87)  BP: 151/90 () (126/83 - 193/108)  RR: 18 ()  SpO2: 100% ()  GENERAL: No acute distress, well-developed  HEAD:  Atraumatic, Normocephalic  ENT: EOMI, PERRLA, conjunctiva and sclera clear, Neck supple, No JVD, moist mucosa  CHEST/LUNG: Clear to auscultation bilaterally; No wheeze, equal breath sounds bilaterally   BACK: No spinal tenderness  HEART: Regular rate and rhythm; No murmurs, rubs, or gallops  ABDOMEN: Soft, Nontender, Nondistended; Bowel sounds present  EXTREMITIES:  No clubbing, cyanosis, or edema  PSYCH: Nl behavior, nl affect  NEUROLOGY: AAOx3, non-focal, cranial nerves intact  SKIN: Normal color, No rashes or lesions  LINES:    Cardiovascular Diagnostic Testing:    ECG: NSR    Echo: pending     Imaging:    CXR: Personally reviewed    Labs: Personally reviewed                         )-----------( 148      ( 05 Sep 2021 20:03 )             31.9     -    136  |  104  |  14  ----------------------------<  99  4.0   |  19<L>  |  0.78    Ca    8.6      05 Sep 2021 20:03    TPro  6.4  /  Alb  3.5  /  TBili  0.3  /  DBili  x   /  AST  21  /  ALT  23  /  AlkPhos  87  09-05    PT/INR - ( 05 Sep 2021 20:03 )   PT: 10.2 sec;   INR: <0.90 ratio         PTT - ( 05 Sep 2021 20:03 )  PTT:31.8 sec          40 yo , asthma (well controlled), hypothyroid, polycystic kidney disease (no history of HTN) now POD 4 s/p uncomplicated C section c/b ongoing HTN. Patient is currently asymptomatic and euvolemic appearing, no evidence of significant cardiopulmonary pathology on exam, BP on my assessment 150s.     - agree with nifedipine as written   - please obtain TTE to rule out underlying structural heart disease   - if remains persistently hypertensive > 140s can consider addition of labetalol 200mg po bid   - cardiology will continue to follow

## 2021-09-06 NOTE — PROGRESS NOTE ADULT - SUBJECTIVE AND OBJECTIVE BOX
OB Postpartum Note:  Delivery, POD#3    S: 40yo POD#3 s/p LTCS. The patient feels well and is up and walking around this AM.  Pain is well controlled. Burning/ palpitations over right chest has significantly improved. Pt received a dose of Reglan overnight. She is tolerating a regular diet and passing flatus. She is voiding spontaneously, and ambulating without difficulty. Denies CP/SOB. Denies lightheadedness/dizziness. Denies N/V.    O:  Vitals:  Vital Signs Last 24 Hrs  T(C): 36.8 (06 Sep 2021 06:15), Max: 37.1 (06 Sep 2021 01:53)  T(F): 98.2 (06 Sep 2021 06:15), Max: 98.7 (06 Sep 2021 01:53)  HR: 74 (06 Sep 2021 07:30) (60 - 87)  BP: 151/98 (06 Sep 2021 07:30) (126/83 - 193/108)  BP(mean): --  RR: 18 (06 Sep 2021 06:15) (16 - 18)  SpO2: 99% (06 Sep 2021 06:15) (98% - 100%)    MEDICATIONS  (STANDING):  acetaminophen   Tablet .. 975 milliGRAM(s) Oral <User Schedule>  buDESOnide   90 MICROgram(s) Inhaler 2 Puff(s) Inhalation two times a day  diphtheria/tetanus/pertussis (acellular) Vaccine (ADAcel) 0.5 milliLiter(s) IntraMuscular once  heparin   Injectable 5000 Unit(s) SubCutaneous every 12 hours  ibuprofen  Tablet. 600 milliGRAM(s) Oral every 6 hours  influenza   Vaccine 0.5 milliLiter(s) IntraMuscular once  lactated ringers Bolus 1000 milliLiter(s) IV Bolus once  lactated ringers. 1000 milliLiter(s) (125 mL/Hr) IV Continuous <Continuous>  lactated ringers. 1000 milliLiter(s) (50 mL/Hr) IV Continuous <Continuous>  levothyroxine 100 MICROGram(s) Oral daily  NIFEdipine XL 60 milliGRAM(s) Oral daily  oxytocin Infusion 16.667 milliUNIT(s)/Min (50 mL/Hr) IV Continuous <Continuous>  prenatal multivitamin 1 Tablet(s) Oral daily    MEDICATIONS  (PRN):  ALBUTerol    90 MICROgram(s) HFA Inhaler 2 Puff(s) Inhalation every 6 hours PRN Shortness of Breath and/or Wheezing  diphenhydrAMINE 25 milliGRAM(s) Oral every 6 hours PRN Pruritus  lanolin Ointment 1 Application(s) Topical every 6 hours PRN Sore Nipples  magnesium hydroxide Suspension 30 milliLiter(s) Oral two times a day PRN Constipation  oxyCODONE    IR 5 milliGRAM(s) Oral once PRN Moderate to Severe Pain (4-10)  oxyCODONE    IR 5 milliGRAM(s) Oral every 3 hours PRN Moderate to Severe Pain (4-10)  simethicone 80 milliGRAM(s) Chew every 4 hours PRN Gas      LABS:  Blood type: O Positive  Rubella IgG: RPR: Negative                          11.1<L>   11.85<H> >-----------< 148<L>    (  @ 20:03 )             31.9<L>                        10.8<L>   12.97<H> >-----------< 168    (  @ 06:38 )             31.6<L>    21 @ 20:03      136  |  104  |  14  ----------------------------<  99  4.0   |  19<L>  |  0.78    21 @ 06:38      129<L>  |  98  |  17  ----------------------------<  106<H>  4.4   |  20<L>  |  0.89        Ca    8.6      05 Sep 2021 20:03  Ca    6.7<L>      04 Sep 2021 06:38  Mg     7.40<HH>       Mg     6.50<H>       Mg     5.80<H>         TPro  6.4  /  Alb  3.5  /  TBili  0.3  /  DBili  x   /  AST  21  /  ALT  23  /  AlkPhos  87  21 @ 20:03  TPro  5.8<L>  /  Alb  3.2<L>  /  TBili  <0.2  /  DBili  x   /  AST  17  /  ALT  14  /  AlkPhos  73  21 @ 06:38          Physical exam:  Gen: NAD  Abdomen: Soft, nontender, no distension , firm uterine fundus at umbilicus.  Incision: Clean, dry, and intact   Pelvic: Normal lochia noted  Ext: No calf tenderness

## 2021-09-06 NOTE — PROVIDER CONTACT NOTE (OTHER) - ACTION/TREATMENT ORDERED:
Repeat BP in 15 minutes
Procardia 60XL X1 now
David ELENA MD to bedside and EKG if symptoms persist.
MD aware, no further orders at this point.  NO need for procardia IR at this time. Will continue to monitor.

## 2021-09-07 ENCOUNTER — APPOINTMENT (OUTPATIENT)
Dept: ANTEPARTUM | Facility: CLINIC | Age: 41
End: 2021-09-07

## 2021-09-07 ENCOUNTER — APPOINTMENT (OUTPATIENT)
Dept: ANTEPARTUM | Facility: HOSPITAL | Age: 41
End: 2021-09-07

## 2021-09-07 PROCEDURE — 93306 TTE W/DOPPLER COMPLETE: CPT | Mod: 26

## 2021-09-07 PROCEDURE — 99232 SBSQ HOSP IP/OBS MODERATE 35: CPT

## 2021-09-07 RX ADMIN — Medication 600 MILLIGRAM(S): at 12:00

## 2021-09-07 RX ADMIN — Medication 0.25 MILLIGRAM(S): at 23:03

## 2021-09-07 RX ADMIN — Medication 600 MILLIGRAM(S): at 19:07

## 2021-09-07 RX ADMIN — HEPARIN SODIUM 5000 UNIT(S): 5000 INJECTION INTRAVENOUS; SUBCUTANEOUS at 18:26

## 2021-09-07 RX ADMIN — Medication 60 MILLIGRAM(S): at 06:06

## 2021-09-07 RX ADMIN — Medication 600 MILLIGRAM(S): at 06:06

## 2021-09-07 RX ADMIN — Medication 200 MILLIGRAM(S): at 22:13

## 2021-09-07 RX ADMIN — Medication 975 MILLIGRAM(S): at 12:00

## 2021-09-07 RX ADMIN — Medication 600 MILLIGRAM(S): at 13:00

## 2021-09-07 RX ADMIN — Medication 975 MILLIGRAM(S): at 22:14

## 2021-09-07 RX ADMIN — Medication 200 MILLIGRAM(S): at 11:01

## 2021-09-07 RX ADMIN — Medication 975 MILLIGRAM(S): at 02:35

## 2021-09-07 RX ADMIN — Medication 600 MILLIGRAM(S): at 07:05

## 2021-09-07 RX ADMIN — Medication 0.25 MILLIGRAM(S): at 03:28

## 2021-09-07 RX ADMIN — Medication 600 MILLIGRAM(S): at 23:04

## 2021-09-07 RX ADMIN — Medication 600 MILLIGRAM(S): at 18:25

## 2021-09-07 RX ADMIN — Medication 60 MILLIGRAM(S): at 18:25

## 2021-09-07 RX ADMIN — HEPARIN SODIUM 5000 UNIT(S): 5000 INJECTION INTRAVENOUS; SUBCUTANEOUS at 06:07

## 2021-09-07 RX ADMIN — Medication 975 MILLIGRAM(S): at 11:00

## 2021-09-07 RX ADMIN — SERTRALINE 50 MILLIGRAM(S): 25 TABLET, FILM COATED ORAL at 12:00

## 2021-09-07 RX ADMIN — Medication 975 MILLIGRAM(S): at 03:30

## 2021-09-07 RX ADMIN — Medication 975 MILLIGRAM(S): at 22:00

## 2021-09-07 NOTE — PROGRESS NOTE ADULT - ATTENDING COMMENTS
42 yo POD#4 s/p pLTCS for worsening sPEC. Now s/p Mg.  Denies HA, blurry vision, SOB, CP, RUQ/epigastric pain  -BPs mild range this AM 130s-140s/80s-90s on lab 200 mg BID and Procardia 60 mg BID  -Appreciate cardiology recommendations. For TTE today  -Anxiety improved with Zoloft and Xanax PRN. Will continue  -If BPs remain mild range and TTE neg, discussed possible d/c home 9/8 with outpatient cardio OB f/u and 1 week BP check in office    ALPESH Alegre MD

## 2021-09-07 NOTE — PROGRESS NOTE ADULT - ASSESSMENT
42 yo , asthma (well controlled), hypothyroid, polycystic kidney disease (no history of HTN) now POD 5 s/p uncomplicated C section c/b ongoing HTN. Patient is currently asymptomatic and euvolemic appearing, no evidence of significant cardiopulmonary pathology on exam, BP currently 142/87.      #Hypertension  - Currently on labetalol 200mg PO BID  - f/u TTE to rule out underlying structural heart disease   - cardiology will continue to follow     *******Note considered incomplete without attending attestation********* 42 yo , asthma (well controlled), hypothyroid, polycystic kidney disease (no history of HTN) now POD 5 s/p uncomplicated C section c/b ongoing HTN. Patient is currently asymptomatic and euvolemic appearing, no evidence of significant cardiopulmonary pathology on exam, BP currently 142/87.      #Hypertension  - Continue labetalol & nifedipine for now, will likely need close BP monitoring at discharge as full effect of BP meds takes about 1-2 weeks to substantiate, at which point will likely need to downtitrate regimen  - f/u TTE to rule out underlying structural heart disease     *******Note considered incomplete without attending attestation********* 40 yo , asthma (well controlled), hypothyroid, polycystic kidney disease (no history of HTN) now POD 5 s/p uncomplicated C section c/b ongoing HTN. Patient is currently asymptomatic and euvolemic appearing, no evidence of significant cardiopulmonary pathology on exam, BP currently 142/87.      #Hypertension  -ECHO unremarkable  -Continue labetalol & nifedipine for now, will likely need close BP monitoring at discharge as full effect of BP meds takes about 1-2 weeks to substantiate, at which point will likely need to downtitrate regimen      *******Note considered incomplete without attending attestation*********

## 2021-09-07 NOTE — PROGRESS NOTE ADULT - SUBJECTIVE AND OBJECTIVE BOX
OB Postpartum Note:  Delivery    S: 42yo now POD #4 s/p LTCS. Her pain is well controlled. She is tolerating a regular diet and passing flatus. Voiding spontaneously and ambulating without difficulty. Denies N/V. Denies CP/SOB/lightheadedness/dizziness.     O:   Vitals:  Vital Signs Last 24 Hrs  T(C): 36.6 (07 Sep 2021 05:18), Max: 37.1 (06 Sep 2021 14:38)  T(F): 97.9 (07 Sep 2021 05:18), Max: 98.8 (06 Sep 2021 14:38)  HR: 92 (07 Sep 2021 05:18) (73 - 100)  BP: 151/95 (07 Sep 2021 05:18) (137/75 - 164/103)  RR: 18 (07 Sep 2021 05:18) (17 - 19)  SpO2: 97% (07 Sep 2021 05:18) (97% - 100%)    MEDICATIONS  (STANDING):  acetaminophen   Tablet .. 975 milliGRAM(s) Oral <User Schedule>  buDESOnide   90 MICROgram(s) Inhaler 2 Puff(s) Inhalation two times a day  diphtheria/tetanus/pertussis (acellular) Vaccine (ADAcel) 0.5 milliLiter(s) IntraMuscular once  heparin   Injectable 5000 Unit(s) SubCutaneous every 12 hours  ibuprofen  Tablet. 600 milliGRAM(s) Oral every 6 hours  influenza   Vaccine 0.5 milliLiter(s) IntraMuscular once  labetalol 200 milliGRAM(s) Oral two times a day  lactated ringers Bolus 1000 milliLiter(s) IV Bolus once  lactated ringers. 1000 milliLiter(s) (125 mL/Hr) IV Continuous <Continuous>  lactated ringers. 1000 milliLiter(s) (50 mL/Hr) IV Continuous <Continuous>  levothyroxine 100 MICROGram(s) Oral daily  NIFEdipine XL 60 milliGRAM(s) Oral every 12 hours  oxytocin Infusion 16.667 milliUNIT(s)/Min (50 mL/Hr) IV Continuous <Continuous>  prenatal multivitamin 1 Tablet(s) Oral daily  sertraline 50 milliGRAM(s) Oral daily    MEDICATIONS  (PRN):  ALBUTerol    90 MICROgram(s) HFA Inhaler 2 Puff(s) Inhalation every 6 hours PRN Shortness of Breath and/or Wheezing  ALPRAZolam 0.25 milliGRAM(s) Oral every 6 hours PRN anxiety  diphenhydrAMINE 25 milliGRAM(s) Oral every 6 hours PRN Pruritus  lanolin Ointment 1 Application(s) Topical every 6 hours PRN Sore Nipples  magnesium hydroxide Suspension 30 milliLiter(s) Oral two times a day PRN Constipation  oxyCODONE    IR 5 milliGRAM(s) Oral once PRN Moderate to Severe Pain (4-10)  oxyCODONE    IR 5 milliGRAM(s) Oral every 3 hours PRN Moderate to Severe Pain (4-10)  simethicone 80 milliGRAM(s) Chew every 4 hours PRN Gas      Labs:  Blood type: O Positive  Rubella IgG: RPR: Negative                          11.1<L>   11.85<H> >-----------< 148<L>    (  @ 20:03 )             31.9<L>    21 @ 20:03      136  |  104  |  14  ----------------------------<  99  4.0   |  19<L>  |  0.78        Ca    8.6      05 Sep 2021 20:03    TPro  6.4  /  Alb  3.5  /  TBili  0.3  /  DBili  x   /  AST  21  /  ALT  23  /  AlkPhos  87  21 @ 20:03      PE:  General: NAD, patient resting comfortably in bed  Abdomen: Mildly distended, appropriately tender. No rebound tenderness or guarding.   Incision: Clean, dry, intact.   Extremities: SCDs in place, no erythema. OB Postpartum Note:  Delivery    S: 40 yo now POD #4 s/p LTCS. Her pain is well controlled. She had a headache that went away with tynelol. She received Xanax overnight for anxiety. Blood pressures overnight ranged 137-151/75-95. She is tolerating a regular diet and passing flatus. Voiding spontaneously and ambulating without difficulty. Denies N/V. Denies CP/SOB/lightheadedness/dizziness. Denies abdominal pain. Denies change in vision, swelling in legs.     O:   Vitals:  Vital Signs Last 24 Hrs  T(C): 36.6 (07 Sep 2021 05:18), Max: 37.1 (06 Sep 2021 14:38)  T(F): 97.9 (07 Sep 2021 05:18), Max: 98.8 (06 Sep 2021 14:38)  HR: 92 (07 Sep 2021 05:18) (73 - 100)  BP: 151/95 (07 Sep 2021 05:18) (137/75 - 164/103)  RR: 18 (07 Sep 2021 05:18) (17 - 19)  SpO2: 97% (07 Sep 2021 05:18) (97% - 100%)    MEDICATIONS  (STANDING):  acetaminophen   Tablet .. 975 milliGRAM(s) Oral <User Schedule>  buDESOnide   90 MICROgram(s) Inhaler 2 Puff(s) Inhalation two times a day  diphtheria/tetanus/pertussis (acellular) Vaccine (ADAcel) 0.5 milliLiter(s) IntraMuscular once  heparin   Injectable 5000 Unit(s) SubCutaneous every 12 hours  ibuprofen  Tablet. 600 milliGRAM(s) Oral every 6 hours  influenza   Vaccine 0.5 milliLiter(s) IntraMuscular once  labetalol 200 milliGRAM(s) Oral two times a day  lactated ringers Bolus 1000 milliLiter(s) IV Bolus once  lactated ringers. 1000 milliLiter(s) (125 mL/Hr) IV Continuous <Continuous>  lactated ringers. 1000 milliLiter(s) (50 mL/Hr) IV Continuous <Continuous>  levothyroxine 100 MICROGram(s) Oral daily  NIFEdipine XL 60 milliGRAM(s) Oral every 12 hours  oxytocin Infusion 16.667 milliUNIT(s)/Min (50 mL/Hr) IV Continuous <Continuous>  prenatal multivitamin 1 Tablet(s) Oral daily  sertraline 50 milliGRAM(s) Oral daily    MEDICATIONS  (PRN):  ALBUTerol    90 MICROgram(s) HFA Inhaler 2 Puff(s) Inhalation every 6 hours PRN Shortness of Breath and/or Wheezing  ALPRAZolam 0.25 milliGRAM(s) Oral every 6 hours PRN anxiety  diphenhydrAMINE 25 milliGRAM(s) Oral every 6 hours PRN Pruritus  lanolin Ointment 1 Application(s) Topical every 6 hours PRN Sore Nipples  magnesium hydroxide Suspension 30 milliLiter(s) Oral two times a day PRN Constipation  oxyCODONE    IR 5 milliGRAM(s) Oral once PRN Moderate to Severe Pain (4-10)  oxyCODONE    IR 5 milliGRAM(s) Oral every 3 hours PRN Moderate to Severe Pain (4-10)  simethicone 80 milliGRAM(s) Chew every 4 hours PRN Gas      Labs:  Blood type: O Positive  Rubella IgG: RPR: Negative                          11.1<L>   11.85<H> >-----------< 148<L>    (  @ 20:03 )             31.9<L>    21 @ 20:03      136  |  104  |  14  ----------------------------<  99  4.0   |  19<L>  |  0.78        Ca    8.6      05 Sep 2021 20:03    TPro  6.4  /  Alb  3.5  /  TBili  0.3  /  DBili  x   /  AST  21  /  ALT  23  /  AlkPhos  87  21 @ 20:03      PE:  General: NAD, patient resting comfortably in bed  Abdomen: Mildly distended, appropriately tender. No rebound tenderness or guarding.   Incision: Clean, dry, intact.   Extremities: No swelling, no erythema. OB Postpartum Note:  Delivery    S: 40 yo now POD #4 s/p LTCS. Her pain is well controlled. She reports a headache yesterday evening which improved s/p Tylenol. She received Xanax overnight for anxiety. Blood pressures overnight ranged 137-151/75-95. She is tolerating a regular diet and passing flatus. Voiding spontaneously and ambulating without difficulty. Denies N/V. Denies CP/SOB/lightheadedness/dizziness. Denies abdominal pain. Denies change in vision, swelling in legs.     O:   Vitals:  Vital Signs Last 24 Hrs  T(C): 36.6 (07 Sep 2021 05:18), Max: 37.1 (06 Sep 2021 14:38)  T(F): 97.9 (07 Sep 2021 05:18), Max: 98.8 (06 Sep 2021 14:38)  HR: 92 (07 Sep 2021 05:18) (73 - 100)  BP: 151/95 (07 Sep 2021 05:18) (137/75 - 164/103)  RR: 18 (07 Sep 2021 05:18) (17 - 19)  SpO2: 97% (07 Sep 2021 05:18) (97% - 100%)    MEDICATIONS  (STANDING):  acetaminophen   Tablet .. 975 milliGRAM(s) Oral <User Schedule>  buDESOnide   90 MICROgram(s) Inhaler 2 Puff(s) Inhalation two times a day  diphtheria/tetanus/pertussis (acellular) Vaccine (ADAcel) 0.5 milliLiter(s) IntraMuscular once  heparin   Injectable 5000 Unit(s) SubCutaneous every 12 hours  ibuprofen  Tablet. 600 milliGRAM(s) Oral every 6 hours  influenza   Vaccine 0.5 milliLiter(s) IntraMuscular once  labetalol 200 milliGRAM(s) Oral two times a day  lactated ringers Bolus 1000 milliLiter(s) IV Bolus once  lactated ringers. 1000 milliLiter(s) (125 mL/Hr) IV Continuous <Continuous>  lactated ringers. 1000 milliLiter(s) (50 mL/Hr) IV Continuous <Continuous>  levothyroxine 100 MICROGram(s) Oral daily  NIFEdipine XL 60 milliGRAM(s) Oral every 12 hours  oxytocin Infusion 16.667 milliUNIT(s)/Min (50 mL/Hr) IV Continuous <Continuous>  prenatal multivitamin 1 Tablet(s) Oral daily  sertraline 50 milliGRAM(s) Oral daily    MEDICATIONS  (PRN):  ALBUTerol    90 MICROgram(s) HFA Inhaler 2 Puff(s) Inhalation every 6 hours PRN Shortness of Breath and/or Wheezing  ALPRAZolam 0.25 milliGRAM(s) Oral every 6 hours PRN anxiety  diphenhydrAMINE 25 milliGRAM(s) Oral every 6 hours PRN Pruritus  lanolin Ointment 1 Application(s) Topical every 6 hours PRN Sore Nipples  magnesium hydroxide Suspension 30 milliLiter(s) Oral two times a day PRN Constipation  oxyCODONE    IR 5 milliGRAM(s) Oral once PRN Moderate to Severe Pain (4-10)  oxyCODONE    IR 5 milliGRAM(s) Oral every 3 hours PRN Moderate to Severe Pain (4-10)  simethicone 80 milliGRAM(s) Chew every 4 hours PRN Gas      Labs:  Blood type: O Positive  Rubella IgG: RPR: Negative                          11.1<L>   11.85<H> >-----------< 148<L>    (  @ 20:03 )             31.9<L>    21 @ 20:03      136  |  104  |  14  ----------------------------<  99  4.0   |  19<L>  |  0.78        Ca    8.6      05 Sep 2021 20:03    TPro  6.4  /  Alb  3.5  /  TBili  0.3  /  DBili  x   /  AST  21  /  ALT  23  /  AlkPhos  87  21 @ 20:03      PE:  General: NAD, patient resting comfortably in bed  Abdomen: Mildly distended, appropriately tender. No rebound tenderness or guarding.   Incision: Clean, dry, intact.   Extremities: No swelling, no erythema.

## 2021-09-07 NOTE — PROGRESS NOTE ADULT - SUBJECTIVE AND OBJECTIVE BOX
PROGRESS NOTE:     Patient is a 41y old  Female who presents with a chief complaint of Preeclampsia (05 Sep 2021 11:05). Pt. examined at bedside in NAD. Pt. states that she felt her heart pounding previously but this has improved although not completely resolved. She had a headache yesterday that resolved with tylenol. Pt. denies chest pain, SOB, leg swelling, orthopnea, palpitations and offers no other complaints.       SUBJECTIVE / OVERNIGHT EVENTS: No acute overnight events    ADDITIONAL REVIEW OF SYSTEMS:  Constitutional: No fever, No weight loss, good appetite/po intake  Head: No headache   Eyes: No blurry vision, No diplopia  Neuro: No tremors, No muscle weakness   Cardiovascular: No chest pain, No palpitations  Respiratory: No SOB, No cough  GI: No nausea, No vomiting, No diarrhea  : No dysuria, No hematuria  Skin: No rash  MSK: No joint pain   Psych: No depression      MEDICATIONS  (STANDING):  acetaminophen   Tablet .. 975 milliGRAM(s) Oral <User Schedule>  buDESOnide   90 MICROgram(s) Inhaler 2 Puff(s) Inhalation two times a day  diphtheria/tetanus/pertussis (acellular) Vaccine (ADAcel) 0.5 milliLiter(s) IntraMuscular once  heparin   Injectable 5000 Unit(s) SubCutaneous every 12 hours  ibuprofen  Tablet. 600 milliGRAM(s) Oral every 6 hours  influenza   Vaccine 0.5 milliLiter(s) IntraMuscular once  labetalol 200 milliGRAM(s) Oral two times a day  lactated ringers Bolus 1000 milliLiter(s) IV Bolus once  lactated ringers. 1000 milliLiter(s) (125 mL/Hr) IV Continuous <Continuous>  lactated ringers. 1000 milliLiter(s) (50 mL/Hr) IV Continuous <Continuous>  levothyroxine 100 MICROGram(s) Oral daily  NIFEdipine XL 60 milliGRAM(s) Oral every 12 hours  oxytocin Infusion 16.667 milliUNIT(s)/Min (50 mL/Hr) IV Continuous <Continuous>  prenatal multivitamin 1 Tablet(s) Oral daily  sertraline 50 milliGRAM(s) Oral daily    MEDICATIONS  (PRN):  ALBUTerol    90 MICROgram(s) HFA Inhaler 2 Puff(s) Inhalation every 6 hours PRN Shortness of Breath and/or Wheezing  ALPRAZolam 0.25 milliGRAM(s) Oral every 6 hours PRN anxiety  diphenhydrAMINE 25 milliGRAM(s) Oral every 6 hours PRN Pruritus  lanolin Ointment 1 Application(s) Topical every 6 hours PRN Sore Nipples  magnesium hydroxide Suspension 30 milliLiter(s) Oral two times a day PRN Constipation  oxyCODONE    IR 5 milliGRAM(s) Oral once PRN Moderate to Severe Pain (4-10)  oxyCODONE    IR 5 milliGRAM(s) Oral every 3 hours PRN Moderate to Severe Pain (4-10)  simethicone 80 milliGRAM(s) Chew every 4 hours PRN Gas      CAPILLARY BLOOD GLUCOSE        I&O's Summary      PHYSICAL EXAM:  Vital Signs Last 24 Hrs  T(C): 36.6 (07 Sep 2021 09:36), Max: 37.1 (06 Sep 2021 14:38)  T(F): 97.9 (07 Sep 2021 09:36), Max: 98.8 (06 Sep 2021 14:38)  HR: 99 (07 Sep 2021 09:36) (81 - 100)  BP: 142/87 (07 Sep 2021 09:36) (136/78 - 159/100)  BP(mean): --  RR: 17 (07 Sep 2021 09:36) (17 - 19)  SpO2: 97% (07 Sep 2021 09:36) (97% - 99%)  GENERAL: NAD, lying comfortably in bed  HEAD: Atraumatic, normocephalic  EYES: EOMI b/l PERRLA b/l, conjunctiva and sclera clear  NECK: Supple, No JVD, No LAD  RESPIRATORY: Normal respiratory effort; lungs are clear to auscultation bilaterally  CARDIOVASCULAR: Regular rate and rhythm, normal S1 and S2, no murmur/rub/gallop; No lower extremity edema; Peripheral pulses are 2+ bilaterally  ABDOMEN: Nontender to palpation, normoactive bowel sounds, no rebound/guarding; No hepatosplenomegaly  MUSCLOSKELETAL: no clubbing or cyanosis of digits; no joint swelling or tenderness to palpation  NEURO: Non focal   PSYCH: A+O to person, place, and time; affect appropriate    LABS:                        11.1   11.85 )-----------( 148      ( 05 Sep 2021 20:03 )             31.9     09-05    136  |  104  |  14  ----------------------------<  99  4.0   |  19<L>  |  0.78    Ca    8.6      05 Sep 2021 20:03    TPro  6.4  /  Alb  3.5  /  TBili  0.3  /  DBili  x   /  AST  21  /  ALT  23  /  AlkPhos  87  09-05    PT/INR - ( 05 Sep 2021 20:03 )   PT: 10.2 sec;   INR: <0.90 ratio         PTT - ( 05 Sep 2021 20:03 )  PTT:31.8 sec            Tele Reviewed:    RADIOLOGY & ADDITIONAL TESTS:  Results Reviewed:   Imaging Personally Reviewed:  Electrocardiogram Personally Reviewed:    COORDINATION OF CARE:  Care Discussed with Consultants/Other Providers [Y/N]:  Prior or Outpatient Records Reviewed [Y/N]:

## 2021-09-07 NOTE — PROGRESS NOTE ADULT - ASSESSMENT
A/P: 42yo POD#3 s/p LTCS.  Patient is clinically stable with uncontrolled BP's   - Continue motrin, tylenol, oxycodone PRN for pain control.  - Xanax prn given for anxiety  - Increase ambulation  - Continue regular diet    #sPEC  - Blood pressures uncontrolled overnight. Pt started on Labetalol 200 mg BID, per cardiology  - c/w Procardia XL 60 BID   - s/p Mag x24 for seizure ppx  - Obtain TTE per cardiology  - HELLP labs wnl, repeat PRN  - Continue BP and symptom monitoring    #Asthma   - Pt takes Pulmicort daily at home and inpatient   - Pt states that she rarely  (before exercise or with a cold) uses rescue inhaler - no use while inpatient.   - Pulls over 2L on IS    [facility] A/P: 40yo POD#4 s/p LTCS.  Patient is clinically stable with uncontrolled BP's     - Continue motrin, tylenol, oxycodone PRN for pain control.  - Xanax prn given for anxiety  - Increase ambulation  - Continue regular diet    #sPEC  - Blood pressures well controlled (mild range) overnight.   - c/w Procardia XL 60 BID, Labetalol 200 mg BID  - s/p Mag x24h pp for seizure ppx  - Will f/u TTE   - HELLP labs wnl, repeat PRN  - Continue BP and symptom monitoring    #Asthma   - Pt takes Pulmicort daily at home and inpatient   - Pt states that she rarely  (before exercise or with a cold) uses rescue inhaler - no use while inpatient.   - Pulls over 2L on IS    [facility]    Patient seen and evaluated on AM rounds - agree with above, edited/revised where appropriate.     Jacek, PGY-3

## 2021-09-07 NOTE — PROGRESS NOTE ADULT - ATTENDING COMMENTS
Blood pressure adequately controlled.   Echo unremarkable.    No further cardiac w/u. Should follow up with Dr. Encinas or Dr. Peters of [facility] at [phone] within 2 weeks.     Please call us back with any questions.

## 2021-09-08 ENCOUNTER — TRANSCRIPTION ENCOUNTER (OUTPATIENT)
Age: 41
End: 2021-09-08

## 2021-09-08 VITALS
RESPIRATION RATE: 16 BRPM | DIASTOLIC BLOOD PRESSURE: 88 MMHG | SYSTOLIC BLOOD PRESSURE: 125 MMHG | TEMPERATURE: 98 F | HEART RATE: 88 BPM | OXYGEN SATURATION: 98 %

## 2021-09-08 PROCEDURE — 99232 SBSQ HOSP IP/OBS MODERATE 35: CPT

## 2021-09-08 RX ORDER — LABETALOL HCL 100 MG
1 TABLET ORAL
Qty: 60 | Refills: 0
Start: 2021-09-08 | End: 2021-10-07

## 2021-09-08 RX ORDER — ACETAMINOPHEN 500 MG
3 TABLET ORAL
Qty: 0 | Refills: 0 | DISCHARGE
Start: 2021-09-08

## 2021-09-08 RX ORDER — SERTRALINE 25 MG/1
1 TABLET, FILM COATED ORAL
Qty: 7 | Refills: 0
Start: 2021-09-08 | End: 2021-09-14

## 2021-09-08 RX ORDER — LEVOTHYROXINE SODIUM 125 MCG
1 TABLET ORAL
Qty: 0 | Refills: 0 | DISCHARGE
Start: 2021-09-08

## 2021-09-08 RX ORDER — BUDESONIDE, MICRONIZED 100 %
0 POWDER (GRAM) MISCELLANEOUS
Qty: 0 | Refills: 0 | DISCHARGE
Start: 2021-09-08

## 2021-09-08 RX ORDER — ALBUTEROL 90 UG/1
2 AEROSOL, METERED ORAL
Qty: 0 | Refills: 0 | DISCHARGE
Start: 2021-09-08

## 2021-09-08 RX ORDER — LEVOTHYROXINE SODIUM 125 MCG
0 TABLET ORAL
Qty: 0 | Refills: 0 | DISCHARGE

## 2021-09-08 RX ORDER — NIFEDIPINE 30 MG
1 TABLET, EXTENDED RELEASE 24 HR ORAL
Qty: 60 | Refills: 0
Start: 2021-09-08 | End: 2021-10-07

## 2021-09-08 RX ORDER — ALPRAZOLAM 0.25 MG
1 TABLET ORAL
Qty: 6 | Refills: 0
Start: 2021-09-08 | End: 2021-09-10

## 2021-09-08 RX ADMIN — Medication 600 MILLIGRAM(S): at 00:00

## 2021-09-08 RX ADMIN — Medication 600 MILLIGRAM(S): at 06:10

## 2021-09-08 RX ADMIN — Medication 600 MILLIGRAM(S): at 12:28

## 2021-09-08 RX ADMIN — Medication 975 MILLIGRAM(S): at 08:24

## 2021-09-08 RX ADMIN — HEPARIN SODIUM 5000 UNIT(S): 5000 INJECTION INTRAVENOUS; SUBCUTANEOUS at 06:11

## 2021-09-08 RX ADMIN — Medication 200 MILLIGRAM(S): at 09:39

## 2021-09-08 RX ADMIN — SERTRALINE 50 MILLIGRAM(S): 25 TABLET, FILM COATED ORAL at 12:28

## 2021-09-08 RX ADMIN — Medication 60 MILLIGRAM(S): at 06:10

## 2021-09-08 NOTE — DISCHARGE NOTE OB - CARE PROVIDER_API CALL
Jennifer Marquez)  [place] [address]  Phone: [phone]  Fax: [phone]  Follow Up Time:

## 2021-09-08 NOTE — DISCHARGE NOTE OB - PLAN OF CARE
Make your follow-up appointment with your doctor for Blood pressure check in 2-3 days  Follow up with your nephrologist as scheduled  Follow up with Do Campuzano at [facility]  No heavy lifting, driving, or strenuous activity for 6 weeks. Nothing per vagina such as tampons, intercourse, douches or tub baths for 6 weeks or until you see your doctor. Call your doctor with any signs and symptoms of infection such as fever, chills, nausea, or vomiting. Call your doctor with redness or swelling at the incision site, fluid leakage, or wound separation. Call your doctor if you're unable to tolerate food, have an increase in vaginal bleeding, or have difficulty urinating. Call your doctor if you have pain that is not relieved by your prescribed medications. Notify your doctor with any other concerns. - Continue BP meds as prescribed (hold is BP is under 110/60 or HR is under 60 for labetalol)  -Take blood pressure with at home cuff prior to taking medications; if BP is >150/100 call MD  - Return to hospital with BP >160/110, headaches, visual changes, abdominal pain, nausea, vomiting, chest pain or shortness of breathe  - Follow up with OB in 2-3 days for BP check  - Follow up with [facility] (email sent for follow up; call [phone])

## 2021-09-08 NOTE — DISCHARGE NOTE OB - MEDICATION SUMMARY - MEDICATIONS TO TAKE
I will START or STAY ON the medications listed below when I get home from the hospital:    budesonide 90 mcg/inh inhalation powder  --  inhaled   -- Indication: For Asthma    acetaminophen 325 mg oral tablet  -- 3 tab(s) by mouth every 6 hours, As Needed  -- Indication: For Pain    sertraline 50 mg oral tablet  -- 1 tab(s) by mouth once a day  -- Indication: For Anxiety    ZyrTEC  -- Indication: For Home med    ALPRAZolam 0.25 mg oral tablet  -- 1 tab(s) by mouth every 12 hours, As Needed -anxiety - for anxiety MDD:2   -- Indication: For Anxiety    labetalol 200 mg oral tablet  -- 1 tab(s) by mouth 2 times a day  -- Indication: For Pre-eclampsia, antepartum    albuterol 90 mcg/inh inhalation aerosol  -- 2 puff(s) inhaled every 6 hours, As needed, Shortness of Breath and/or Wheezing  -- Indication: For Asthma    NIFEdipine 60 mg oral tablet, extended release  -- 1 tab(s) by mouth every 12 hours  -- Indication: For Pre-eclampsia, antepartum    PNV Prenatal  -- Indication: For Postpartum    levothyroxine 100 mcg (0.1 mg) oral tablet  -- 1 tab(s) by mouth once a day  -- Indication: For Hypothyroid

## 2021-09-08 NOTE — DISCHARGE NOTE OB - CARE PLAN
Principal Discharge DX:	 delivery delivered  Assessment and plan of treatment:	Make your follow-up appointment with your doctor for Blood pressure check in 2-3 days  Follow up with your nephrologist as scheduled  Follow up with  [facility] at [facility]  No heavy lifting, driving, or strenuous activity for 6 weeks. Nothing per vagina such as tampons, intercourse, douches or tub baths for 6 weeks or until you see your doctor. Call your doctor with any signs and symptoms of infection such as fever, chills, nausea, or vomiting. Call your doctor with redness or swelling at the incision site, fluid leakage, or wound separation. Call your doctor if you're unable to tolerate food, have an increase in vaginal bleeding, or have difficulty urinating. Call your doctor if you have pain that is not relieved by your prescribed medications. Notify your doctor with any other concerns.  Secondary Diagnosis:	Hypertension in pregnancy, preeclampsia  Assessment and plan of treatment:	- Continue BP meds as prescribed (hold is BP is under 110/60 or HR is under 60 for labetalol)  -Take blood pressure with at home cuff prior to taking medications; if BP is >150/100 call MD  - Return to hospital with BP >160/110, headaches, visual changes, abdominal pain, nausea, vomiting, chest pain or shortness of breathe  - Follow up with OB in 2-3 days for BP check  - Follow up with [facility] (email sent for follow up; call [phone])   1

## 2021-09-08 NOTE — PROGRESS NOTE ADULT - ASSESSMENT
A/P: 42yo POD #5 s/p LTCS.  Patient is stable and doing well post-operatively.  TTE within normal limits.  - Continue regular diet.  - Increase ambulation as tolerated.  - Continue standing Ibuprofen and Acetaminophen for pain. Oxycodone available PRN for breakthrough pain  - Continue Zoloft and Xanax PRN for anxiety    #sPEC  - BP well controlled overnight  - Continue Labetalol 200 mg BID and Procardia XL 60 BID  - HELLP labs wnl, repeat PRN  - Continue BP monitoring       #Asthma  - Pt takes Pulmicort daily at home and inpatient  - Pulls over 2L on IS    Dispo: discharge today with outpatient f/u and BP check in 1 week    [facility] A/P: 42yo POD #5 s/p LTCS.  Patient is stable and doing well post-operatively.  TTE within normal limits.    - Continue regular diet.  - Increase ambulation as tolerated.  - Continue standing Ibuprofen and Acetaminophen for pain. Oxycodone available PRN for breakthrough pain  - Continue Zoloft and Xanax PRN for anxiety    #sPEC  - BP well controlled overnight  - Continue Labetalol 200 mg BID and Procardia XL 60 BID  - HELLP labs wnl, repeat PRN  - Continue BP monitoring   - Cardio OB email for outpatient follow-up       #Asthma  - Pt takes Pulmicort daily at home and inpatient  - Pulls over 2L on IS    Dispo: discharge today with outpatient f/u and BP check in 1 week    [facility]  Jacek, PGY-3

## 2021-09-08 NOTE — DISCHARGE NOTE OB - HOSPITAL COURSE
42yo s/p LTCS at 33+ for sPEC. Pt now s/p MgSo4 and BP controlled after Medications titrated for optimization, now on procardia 60xl BID and labetalol 200mg BID. HELLP labs nl.   Patient is stable and doing well post-operatively.  She was seen by cardiology in patient, recs appreciated, TTE within normal limits, pt set up for Cardio OB outpatient follow up. She was seen by nephro due to history of polycystic kidney disease. Pt had anxiety/depression; started on Zoloft/xanax and is to follow up with [facility]  psych program outpatient. Pt denies headaches visual changes RUQ pain N/V chest pain. Patient is stable for discharge home with close outpatient follow up with OB in 2 days, Cardiology, and nephrology.

## 2021-09-08 NOTE — PROGRESS NOTE ADULT - ATTENDING COMMENTS
Pt seen and examined, improved. BPs are improved on labetalol and procardia, continue as outpatient, cardio consult appreciated, TTE nml.   post partum anxiety - sx improved continue xanax prn and zoloft and will fu with [facility]  psych as out patient.   discharge today

## 2021-09-08 NOTE — DISCHARGE NOTE OB - ADDITIONAL INSTRUCTIONS
Follow up with OB in 2-3 days for BP check  Follow up with [facility]; email sent for apt they will call you with time  Follow up with your nephrologist  Follow up with  Psych outpatient at [facility] [phone]

## 2021-09-08 NOTE — DISCHARGE NOTE OB - PATIENT PORTAL LINK FT
You can access the FollowMyHealth Patient Portal offered by [facility] by registering at the following website: [email or web address]. By joining [employer]’s FollowMyHealth portal, you will also be able to view your health information using other applications (apps) compatible with our system.

## 2021-09-08 NOTE — PROGRESS NOTE ADULT - SUBJECTIVE AND OBJECTIVE BOX
OB Postpartum Note:  Delivery    S: 42yo now POD #5 s/p LTCS. Her pain is well controlled. She is tolerating a regular diet and passing flatus. Voiding spontaneously and ambulating without difficulty. Denies N/V. Denies CP/SOB/lightheadedness/dizziness.     O:   Vitals:  Vital Signs Last 24 Hrs  T(C): 37.3 (08 Sep 2021 05:45), Max: 37.3 (08 Sep 2021 05:45)  T(F): 99.1 (08 Sep 2021 05:45), Max: 99.1 (08 Sep 2021 05:45)  HR: 84 (08 Sep 2021 05:45) (78 - 99)  BP: 145/98 (08 Sep 2021 05:45) (122/92 - 145/98)  BP(mean): --  RR: 17 (08 Sep 2021 05:45) (17 - 19)  SpO2: 98% (08 Sep 2021 05:45) (97% - 100%)    MEDICATIONS  (STANDING):  acetaminophen   Tablet .. 975 milliGRAM(s) Oral <User Schedule>  buDESOnide   90 MICROgram(s) Inhaler 2 Puff(s) Inhalation two times a day  diphtheria/tetanus/pertussis (acellular) Vaccine (ADAcel) 0.5 milliLiter(s) IntraMuscular once  heparin   Injectable 5000 Unit(s) SubCutaneous every 12 hours  ibuprofen  Tablet. 600 milliGRAM(s) Oral every 6 hours  influenza   Vaccine 0.5 milliLiter(s) IntraMuscular once  labetalol 200 milliGRAM(s) Oral two times a day  lactated ringers Bolus 1000 milliLiter(s) IV Bolus once  lactated ringers. 1000 milliLiter(s) (125 mL/Hr) IV Continuous <Continuous>  lactated ringers. 1000 milliLiter(s) (50 mL/Hr) IV Continuous <Continuous>  levothyroxine 100 MICROGram(s) Oral daily  NIFEdipine XL 60 milliGRAM(s) Oral every 12 hours  oxytocin Infusion 16.667 milliUNIT(s)/Min (50 mL/Hr) IV Continuous <Continuous>  prenatal multivitamin 1 Tablet(s) Oral daily  sertraline 50 milliGRAM(s) Oral daily    MEDICATIONS  (PRN):  ALBUTerol    90 MICROgram(s) HFA Inhaler 2 Puff(s) Inhalation every 6 hours PRN Shortness of Breath and/or Wheezing  ALPRAZolam 0.25 milliGRAM(s) Oral every 6 hours PRN anxiety  diphenhydrAMINE 25 milliGRAM(s) Oral every 6 hours PRN Pruritus  lanolin Ointment 1 Application(s) Topical every 6 hours PRN Sore Nipples  magnesium hydroxide Suspension 30 milliLiter(s) Oral two times a day PRN Constipation  oxyCODONE    IR 5 milliGRAM(s) Oral once PRN Moderate to Severe Pain (4-10)  oxyCODONE    IR 5 milliGRAM(s) Oral every 3 hours PRN Moderate to Severe Pain (4-10)  simethicone 80 milliGRAM(s) Chew every 4 hours PRN Gas      Labs:  Blood type: O Positive  Rubella IgG: RPR: Negative                          11.1<L>   11.85<H> >-----------< 148<L>    (  @ 20:03 )             31.9<L>    21 @ 20:03      136  |  104  |  14  ----------------------------<  99  4.0   |  19<L>  |  0.78        Ca    8.6      05 Sep 2021 20:03    TPro  6.4  /  Alb  3.5  /  TBili  0.3  /  DBili  x   /  AST  21  /  ALT  23  /  AlkPhos  87  21 @ 20:03          PE:  General: NAD, patient resting comfortably in bed  Abdomen: Mildly distended, appropriately tender. No rebound tenderness or guarding.   Incision: Clean, dry, intact.   Extremities: SCDs in place, no erythema.    A/P: 42yo POD #3 s/p LTCS.  Patient is stable and doing well post-operatively.    - Continue regular diet.  - Increase ambulation as tolerated.  - Continue standing Ibuprofen and Acetaminophen for pain. Oxycodone available PRN for breakthrough pain.    - POD #3 CBC this morning.   - DVT prophylaxis with Heparin 5000u BID    Tiffany Brunner PGY-1 OB Postpartum Note:  Delivery    S: 40yo now POD #5 s/p LTCS. Her pain is well controlled. She is tolerating a regular diet and passing flatus. Voiding spontaneously and ambulating without difficulty. Denies N/V. Denies CP/SOB/lightheadedness/dizziness. Denies abdominal pain and HA. BP's stable overnight.    O:   Vitals:  Vital Signs Last 24 Hrs  T(C): 37.3 (08 Sep 2021 05:45), Max: 37.3 (08 Sep 2021 05:45)  T(F): 99.1 (08 Sep 2021 05:45), Max: 99.1 (08 Sep 2021 05:45)  HR: 84 (08 Sep 2021 05:45) (78 - 99)  BP: 145/98 (08 Sep 2021 05:45) (122/92 - 145/98)  BP(mean): --  RR: 17 (08 Sep 2021 05:45) (17 - 19)  SpO2: 98% (08 Sep 2021 05:45) (97% - 100%)    MEDICATIONS  (STANDING):  acetaminophen   Tablet .. 975 milliGRAM(s) Oral <User Schedule>  buDESOnide   90 MICROgram(s) Inhaler 2 Puff(s) Inhalation two times a day  diphtheria/tetanus/pertussis (acellular) Vaccine (ADAcel) 0.5 milliLiter(s) IntraMuscular once  heparin   Injectable 5000 Unit(s) SubCutaneous every 12 hours  ibuprofen  Tablet. 600 milliGRAM(s) Oral every 6 hours  influenza   Vaccine 0.5 milliLiter(s) IntraMuscular once  labetalol 200 milliGRAM(s) Oral two times a day  lactated ringers Bolus 1000 milliLiter(s) IV Bolus once  lactated ringers. 1000 milliLiter(s) (125 mL/Hr) IV Continuous <Continuous>  lactated ringers. 1000 milliLiter(s) (50 mL/Hr) IV Continuous <Continuous>  levothyroxine 100 MICROGram(s) Oral daily  NIFEdipine XL 60 milliGRAM(s) Oral every 12 hours  oxytocin Infusion 16.667 milliUNIT(s)/Min (50 mL/Hr) IV Continuous <Continuous>  prenatal multivitamin 1 Tablet(s) Oral daily  sertraline 50 milliGRAM(s) Oral daily    MEDICATIONS  (PRN):  ALBUTerol    90 MICROgram(s) HFA Inhaler 2 Puff(s) Inhalation every 6 hours PRN Shortness of Breath and/or Wheezing  ALPRAZolam 0.25 milliGRAM(s) Oral every 6 hours PRN anxiety  diphenhydrAMINE 25 milliGRAM(s) Oral every 6 hours PRN Pruritus  lanolin Ointment 1 Application(s) Topical every 6 hours PRN Sore Nipples  magnesium hydroxide Suspension 30 milliLiter(s) Oral two times a day PRN Constipation  oxyCODONE    IR 5 milliGRAM(s) Oral once PRN Moderate to Severe Pain (4-10)  oxyCODONE    IR 5 milliGRAM(s) Oral every 3 hours PRN Moderate to Severe Pain (4-10)  simethicone 80 milliGRAM(s) Chew every 4 hours PRN Gas      Labs:  Blood type: O Positive  Rubella IgG: RPR: Negative                          11.1<L>   11.85<H> >-----------< 148<L>    (  @ 20:03 )             31.9<L>    21 @ 20:03      136  |  104  |  14  ----------------------------<  99  4.0   |  19<L>  |  0.78        Ca    8.6      05 Sep 2021 20:03    TPro  6.4  /  Alb  3.5  /  TBili  0.3  /  DBili  x   /  AST  21  /  ALT  23  /  AlkPhos  87  21 @ 20:03      PE:  General: NAD, patient resting comfortably in bed  Abdomen: Mildly distended, appropriately tender. No rebound tenderness or guarding.   Incision: Clean, dry, intact.   Extremities: SCDs in place, no erythema. No swelling.    TTE 21 - Normal findings. EF 62%

## 2021-09-09 ENCOUNTER — NON-APPOINTMENT (OUTPATIENT)
Age: 41
End: 2021-09-09

## 2021-09-09 RX ORDER — ALBUTEROL SULFATE 90 UG/1
108 (90 BASE) INHALANT RESPIRATORY (INHALATION)
Refills: 0 | Status: ACTIVE | COMMUNITY
Start: 2021-09-09

## 2021-09-09 RX ORDER — LEVOTHYROXINE SODIUM 0.07 MG/1
75 TABLET ORAL DAILY
Qty: 30 | Refills: 3 | Status: DISCONTINUED | COMMUNITY
Start: 2021-03-26 | End: 2021-09-09

## 2021-09-09 RX ORDER — SERTRALINE HYDROCHLORIDE 50 MG/1
50 TABLET, FILM COATED ORAL DAILY
Refills: 0 | Status: ACTIVE | COMMUNITY
Start: 2021-09-09

## 2021-09-10 ENCOUNTER — APPOINTMENT (OUTPATIENT)
Dept: OBGYN | Facility: CLINIC | Age: 41
End: 2021-09-10

## 2021-09-10 ENCOUNTER — APPOINTMENT (OUTPATIENT)
Dept: PEDIATRIC CARDIOLOGY | Facility: CLINIC | Age: 41
End: 2021-09-10

## 2021-09-10 ENCOUNTER — APPOINTMENT (OUTPATIENT)
Dept: OBGYN | Facility: CLINIC | Age: 41
End: 2021-09-10
Payer: COMMERCIAL

## 2021-09-10 VITALS — SYSTOLIC BLOOD PRESSURE: 124 MMHG | DIASTOLIC BLOOD PRESSURE: 80 MMHG

## 2021-09-10 DIAGNOSIS — F41.8 OTHER MENTAL DISORDERS COMPLICATING THE PUERPERIUM: ICD-10-CM

## 2021-09-10 PROCEDURE — 0503F POSTPARTUM CARE VISIT: CPT

## 2021-09-10 NOTE — HISTORY OF PRESENT ILLNESS
[FreeTextEntry1] : 41  1 week pp from 33wk delivery / sPEC. doing well. bps controled, asymptomatic, feels well on zoloft, has not needed to take any xanax, only taking tylenol for incision pain. Breast pumping.

## 2021-09-10 NOTE — PLAN
[FreeTextEntry1] : POD 7 s/p PEC doing well\par PEC - bps 120-130/90\par continue regimen\par f/u with cardio 9/23\par \par PPD/PPA - zoloft refill, f/u at [facility]\par \par RTO 1 week

## 2021-09-13 ENCOUNTER — NON-APPOINTMENT (OUTPATIENT)
Age: 41
End: 2021-09-13

## 2021-09-14 ENCOUNTER — APPOINTMENT (OUTPATIENT)
Dept: ANTEPARTUM | Facility: CLINIC | Age: 41
End: 2021-09-14

## 2021-09-15 ENCOUNTER — TRANSCRIPTION ENCOUNTER (OUTPATIENT)
Age: 41
End: 2021-09-15

## 2021-09-15 ENCOUNTER — APPOINTMENT (OUTPATIENT)
Dept: CARE COORDINATION | Facility: HOME HEALTH | Age: 41
End: 2021-09-15
Payer: COMMERCIAL

## 2021-09-15 VITALS — DIASTOLIC BLOOD PRESSURE: 83 MMHG | SYSTOLIC BLOOD PRESSURE: 121 MMHG | TEMPERATURE: 98.2 F | HEART RATE: 74 BPM

## 2021-09-15 DIAGNOSIS — F32.9 MAJOR DEPRESSIVE DISORDER, SINGLE EPISODE, UNSPECIFIED: ICD-10-CM

## 2021-09-15 DIAGNOSIS — J45.909 UNSPECIFIED ASTHMA, UNCOMPLICATED: ICD-10-CM

## 2021-09-15 PROCEDURE — 99350 HOME/RES VST EST HIGH MDM 60: CPT

## 2021-09-15 PROCEDURE — 96127 BRIEF EMOTIONAL/BEHAV ASSMT: CPT

## 2021-09-15 NOTE — HISTORY OF PRESENT ILLNESS
[Postpartum Follow Up] : postpartum follow up [Complications:___] : complications include: [unfilled] [Preeclampsia] : preeclampsia [Delivery Date: ___] : on [unfilled] [Primary C/S] : delivered by  section [Female] : Delivery History: baby girl [NICU: ___] : NICU: [unfilled] [None] : No associated symptoms are reported [Mild] : mild vaginal bleeding [Not Done] : Examination of breasts not done [Doing Well] : is doing well [Excellent Pain Control] : has excellent pain control [FreeTextEntry8] : Blood Pressure Monitoring, Lactation counseling [FreeTextEntry9] : 42 y/o h/o gHTN with PEC, depression, asthma, hypothyroid, PCKD, Class I obesity [de-identified] :  @ 33.3 weeks [de-identified] : Breast Pumping [de-identified] : In NAD, Lungs clear bilaterally, bilateral lower leg edema resolved

## 2021-09-17 ENCOUNTER — APPOINTMENT (OUTPATIENT)
Dept: OBGYN | Facility: CLINIC | Age: 41
End: 2021-09-17
Payer: COMMERCIAL

## 2021-09-17 VITALS — SYSTOLIC BLOOD PRESSURE: 130 MMHG | DIASTOLIC BLOOD PRESSURE: 82 MMHG

## 2021-09-17 PROCEDURE — 0503F POSTPARTUM CARE VISIT: CPT

## 2021-09-18 NOTE — HISTORY OF PRESENT ILLNESS
[Postpartum Follow Up] : postpartum follow up [Delivery Date: ___] : on [unfilled] [Primary C/S] : delivered by  section [Doing Well] : is doing well [No Sign of Infection] : is showing no signs of infection [Female] : Delivery History: baby girl [NICU: ___] : NICU: [unfilled] [Abdominal Pain] : no abdominal pain [Back Pain] : no back pain [Breast Pain] : no breast pain [Chest Pain] : no chest pain [S/Sx PP Depression] : no signs/symptoms of postpartum depression [Heavy Bleeding] : no heavy bleeding [Incisional Drainage] : no incisional drainage [Incisional Pain] : no incisional pain [Shortness of Breath] : no shortness of breath [Suicidal Ideation] : no suicidal ideation [None] : No associated symptoms are reported [Clean/Dry/Intact] : clean, dry and intact [Erythema] : not erythematous [Healed] : healed [Mild] : mild vaginal bleeding [Not Done] : Examination of breasts not done [No Dobbins] : to avoid sexual intercourse [Unlimited ADLs] : to participate in activities of daily living without limitations as pain allows [FreeTextEntry9] :  pLTCS for sPEC on 9/3/21 [de-identified] : currently pumping  [de-identified] : Pt with severe anxiety immediately post-partum, controlled with Zoloft. Symtoms improving. [de-identified] : Procardia 60 XL BID, lab 200 BID, synthroid , Zoloft  [de-identified] : Dermabond prineo bandage removed. Small blister at L angle of incision noted after bandage removed not weeping. 1cm firm area at L angle of incision. No overlying erythema or drainage. Likely seroma or scar tissue. [de-identified] : 2 weeks postpartum s/p pLTCS for sPEC. L incisional angle with resolving skin blister and underlying 1 cm firm area at L angle, naniley seroma vs. scar tissue . Follow-up at 6w postpartum visit [de-identified] : Pt to cont with Procardia 60XL BID, lab 200 BID. Pt's mood is improved. She has multiple support systems via [employer]. F/u in 4 weeks for full PP visit . Bacitracin ointment for lateral L blister. Pt to f/u with cardio OB next week

## 2021-09-20 LAB — SURGICAL PATHOLOGY STUDY: SIGNIFICANT CHANGE UP

## 2021-09-21 ENCOUNTER — APPOINTMENT (OUTPATIENT)
Dept: PEDIATRIC CARDIOLOGY | Facility: CLINIC | Age: 41
End: 2021-09-21

## 2021-09-22 ENCOUNTER — APPOINTMENT (OUTPATIENT)
Dept: OBGYN | Facility: CLINIC | Age: 41
End: 2021-09-22

## 2021-09-22 ENCOUNTER — TRANSCRIPTION ENCOUNTER (OUTPATIENT)
Age: 41
End: 2021-09-22

## 2021-09-23 ENCOUNTER — NON-APPOINTMENT (OUTPATIENT)
Age: 41
End: 2021-09-23

## 2021-09-23 ENCOUNTER — APPOINTMENT (OUTPATIENT)
Dept: CARDIOLOGY | Facility: CLINIC | Age: 41
End: 2021-09-23
Payer: COMMERCIAL

## 2021-09-23 VITALS
OXYGEN SATURATION: 98 % | WEIGHT: 163 LBS | HEART RATE: 72 BPM | DIASTOLIC BLOOD PRESSURE: 84 MMHG | SYSTOLIC BLOOD PRESSURE: 119 MMHG | HEIGHT: 62 IN | BODY MASS INDEX: 30 KG/M2

## 2021-09-23 DIAGNOSIS — Z13.6 ENCOUNTER FOR SCREENING FOR CARDIOVASCULAR DISORDERS: ICD-10-CM

## 2021-09-23 DIAGNOSIS — Q61.3 POLYCYSTIC KIDNEY, UNSPECIFIED: ICD-10-CM

## 2021-09-23 PROCEDURE — 93000 ELECTROCARDIOGRAM COMPLETE: CPT

## 2021-09-23 PROCEDURE — 99205 OFFICE O/P NEW HI 60 MIN: CPT

## 2021-09-24 ENCOUNTER — NON-APPOINTMENT (OUTPATIENT)
Age: 41
End: 2021-09-24

## 2021-09-27 ENCOUNTER — NON-APPOINTMENT (OUTPATIENT)
Age: 41
End: 2021-09-27

## 2021-09-29 ENCOUNTER — LABORATORY RESULT (OUTPATIENT)
Age: 41
End: 2021-09-29

## 2021-09-29 ENCOUNTER — APPOINTMENT (OUTPATIENT)
Dept: OBGYN | Facility: CLINIC | Age: 41
End: 2021-09-29
Payer: COMMERCIAL

## 2021-09-29 VITALS — SYSTOLIC BLOOD PRESSURE: 124 MMHG | DIASTOLIC BLOOD PRESSURE: 76 MMHG

## 2021-09-29 PROCEDURE — 0503F POSTPARTUM CARE VISIT: CPT

## 2021-09-29 NOTE — PHYSICAL EXAM
[Appropriately responsive] : appropriately responsive [Alert] : alert [No Acute Distress] : no acute distress [Examination Of The Breasts] : a normal appearance [Normal] : normal [Superficial] : superficial [Rubbery] : rubbery [Mobile] : mobile [] : in the 10:00 position [___cm Radial Distance from the Nipple] : [unfilled] ~Ucm radially from the nipple [___cm] : a ~M [unfilled] ~Ucm inferior lateral quadrant mass was palpated [Deep] : deep [Firm] : firm [Nontender] : nontender [4:00] : in the 4:00 position [5:00] : in the 5:00 position

## 2021-09-29 NOTE — HISTORY OF PRESENT ILLNESS
[FreeTextEntry1] : 40 yo  s/p pLTCS at 34 w on 9/3/21 for sPEC. Pt presents c/o of L breast pain in the setting of breastfeeding/pumping. Pt called  c/o of pinpoint L lateral breast pain that developed, due to a block milk duct. Over the next few days, the pain had improved slightly, but the block duct increased in size, approximately the size of an egg. She denies fever, chills, breast erythema, edema. Pt has been using hot packs, massage, and frequent pumping without resolution of the blocked duct.  \par \par Pt reports that she is being weaned from her BP medications and taking nifedipine 30 BID, lab 200 BID. BPs normotensive.\par

## 2021-09-29 NOTE — PLAN
[FreeTextEntry1] : #blocked milk duct\par -able to partially reduce blocked duct with aggressive massage\par -cont warm compresses, pumping, and massage to express milk\par -no signs of mastitis, will not treat with abx at this time\par \par f/u 10/19 for 6w PP check\par ELIAS Alegre MD

## 2021-10-01 LAB
T3RU NFR SERPL: 1 TBI
T4 FREE SERPL-MCNC: 1.5 NG/DL
T4 SERPL-MCNC: 8.7 UG/DL
TSH SERPL-ACNC: 0.12 UIU/ML

## 2021-10-05 LAB — T3REVERSE SERPL-MCNC: 19.5 NG/DL

## 2021-10-08 ENCOUNTER — NON-APPOINTMENT (OUTPATIENT)
Age: 41
End: 2021-10-08

## 2021-10-11 ENCOUNTER — NON-APPOINTMENT (OUTPATIENT)
Age: 41
End: 2021-10-11

## 2021-10-18 ENCOUNTER — APPOINTMENT (OUTPATIENT)
Dept: CARDIOLOGY | Facility: CLINIC | Age: 41
End: 2021-10-18

## 2021-10-19 ENCOUNTER — APPOINTMENT (OUTPATIENT)
Dept: OBGYN | Facility: CLINIC | Age: 41
End: 2021-10-19
Payer: COMMERCIAL

## 2021-10-19 VITALS — WEIGHT: 165 LBS | SYSTOLIC BLOOD PRESSURE: 122 MMHG | DIASTOLIC BLOOD PRESSURE: 98 MMHG | BODY MASS INDEX: 30.18 KG/M2

## 2021-10-19 VITALS — SYSTOLIC BLOOD PRESSURE: 128 MMHG | DIASTOLIC BLOOD PRESSURE: 98 MMHG

## 2021-10-19 PROCEDURE — 0503F POSTPARTUM CARE VISIT: CPT

## 2021-10-20 LAB — HPV HIGH+LOW RISK DNA PNL CVX: NOT DETECTED

## 2021-10-26 ENCOUNTER — APPOINTMENT (OUTPATIENT)
Dept: CARDIOLOGY | Facility: CLINIC | Age: 41
End: 2021-10-26
Payer: COMMERCIAL

## 2021-10-26 DIAGNOSIS — O14.90 UNSPECIFIED PRE-ECLAMPSIA, UNSPECIFIED TRIMESTER: ICD-10-CM

## 2021-10-26 LAB — CYTOLOGY CVX/VAG DOC THIN PREP: NORMAL

## 2021-10-26 PROCEDURE — 99213 OFFICE O/P EST LOW 20 MIN: CPT | Mod: 95

## 2021-10-31 PROBLEM — O14.90 PREECLAMPSIA: Status: ACTIVE | Noted: 2021-09-09

## 2022-01-06 ENCOUNTER — NON-APPOINTMENT (OUTPATIENT)
Age: 42
End: 2022-01-06

## 2022-01-07 ENCOUNTER — NON-APPOINTMENT (OUTPATIENT)
Age: 42
End: 2022-01-07

## 2022-05-10 ENCOUNTER — APPOINTMENT (OUTPATIENT)
Dept: MAMMOGRAPHY | Facility: CLINIC | Age: 42
End: 2022-05-10
Payer: COMMERCIAL

## 2022-05-10 ENCOUNTER — OUTPATIENT (OUTPATIENT)
Dept: OUTPATIENT SERVICES | Facility: HOSPITAL | Age: 42
LOS: 1 days | End: 2022-05-10
Payer: COMMERCIAL

## 2022-05-10 ENCOUNTER — APPOINTMENT (OUTPATIENT)
Dept: ULTRASOUND IMAGING | Facility: CLINIC | Age: 42
End: 2022-05-10
Payer: COMMERCIAL

## 2022-05-10 DIAGNOSIS — Z98.890 OTHER SPECIFIED POSTPROCEDURAL STATES: Chronic | ICD-10-CM

## 2022-05-10 DIAGNOSIS — D24.2 BENIGN NEOPLASM OF LEFT BREAST: ICD-10-CM

## 2022-05-10 PROCEDURE — 77066 DX MAMMO INCL CAD BI: CPT | Mod: 26

## 2022-05-10 PROCEDURE — 76641 ULTRASOUND BREAST COMPLETE: CPT | Mod: 26,50

## 2022-05-10 PROCEDURE — G0279: CPT | Mod: 26

## 2022-05-10 PROCEDURE — G0279: CPT

## 2022-05-10 PROCEDURE — 76641 ULTRASOUND BREAST COMPLETE: CPT

## 2022-05-10 PROCEDURE — 77066 DX MAMMO INCL CAD BI: CPT

## 2022-11-18 ENCOUNTER — APPOINTMENT (OUTPATIENT)
Dept: MRI IMAGING | Facility: CLINIC | Age: 42
End: 2022-11-18

## 2022-11-18 ENCOUNTER — RESULT REVIEW (OUTPATIENT)
Age: 42
End: 2022-11-18

## 2022-11-18 ENCOUNTER — OUTPATIENT (OUTPATIENT)
Dept: OUTPATIENT SERVICES | Facility: HOSPITAL | Age: 42
LOS: 1 days | End: 2022-11-18
Payer: COMMERCIAL

## 2022-11-18 DIAGNOSIS — Z80.3 FAMILY HISTORY OF MALIGNANT NEOPLASM OF BREAST: ICD-10-CM

## 2022-11-18 DIAGNOSIS — Z98.890 OTHER SPECIFIED POSTPROCEDURAL STATES: Chronic | ICD-10-CM

## 2022-11-18 PROCEDURE — 77049 MRI BREAST C-+ W/CAD BI: CPT | Mod: 26

## 2022-11-18 PROCEDURE — C8908: CPT

## 2022-11-18 PROCEDURE — C8937: CPT

## 2022-11-18 PROCEDURE — A9585: CPT

## 2022-12-12 ENCOUNTER — APPOINTMENT (OUTPATIENT)
Dept: OBGYN | Facility: CLINIC | Age: 42
End: 2022-12-12

## 2022-12-23 ENCOUNTER — RESULT REVIEW (OUTPATIENT)
Age: 42
End: 2022-12-23

## 2022-12-23 ENCOUNTER — ASOB RESULT (OUTPATIENT)
Age: 42
End: 2022-12-23

## 2022-12-23 ENCOUNTER — APPOINTMENT (OUTPATIENT)
Dept: OBGYN | Facility: CLINIC | Age: 42
End: 2022-12-23

## 2022-12-23 VITALS
HEIGHT: 62 IN | WEIGHT: 170 LBS | SYSTOLIC BLOOD PRESSURE: 115 MMHG | BODY MASS INDEX: 31.28 KG/M2 | DIASTOLIC BLOOD PRESSURE: 75 MMHG

## 2022-12-23 DIAGNOSIS — Z34.90 ENCOUNTER FOR SUPERVISION OF NORMAL PREGNANCY, UNSPECIFIED, UNSPECIFIED TRIMESTER: ICD-10-CM

## 2022-12-23 PROCEDURE — 36415 COLL VENOUS BLD VENIPUNCTURE: CPT

## 2022-12-23 PROCEDURE — 76801 OB US < 14 WKS SINGLE FETUS: CPT

## 2022-12-23 PROCEDURE — 99213 OFFICE O/P EST LOW 20 MIN: CPT | Mod: 25

## 2022-12-24 LAB
ABO + RH PNL BLD: NORMAL
ALBUMIN SERPL ELPH-MCNC: 4.6 G/DL
ALP BLD-CCNC: 52 U/L
ALT SERPL-CCNC: 12 U/L
ANION GAP SERPL CALC-SCNC: 13 MMOL/L
AST SERPL-CCNC: 15 U/L
BASOPHILS # BLD AUTO: 0.03 K/UL
BASOPHILS NFR BLD AUTO: 0.4 %
BILIRUB SERPL-MCNC: 0.5 MG/DL
BUN SERPL-MCNC: 14 MG/DL
CALCIUM SERPL-MCNC: 9.4 MG/DL
CHLORIDE SERPL-SCNC: 102 MMOL/L
CO2 SERPL-SCNC: 23 MMOL/L
CREAT SERPL-MCNC: 0.89 MG/DL
EGFR: 83 ML/MIN/1.73M2
EOSINOPHIL # BLD AUTO: 0.19 K/UL
EOSINOPHIL NFR BLD AUTO: 2.5 %
GLUCOSE SERPL-MCNC: 82 MG/DL
HCG SERPL-MCNC: ABNORMAL MIU/ML
HCT VFR BLD CALC: 39.3 %
HGB BLD-MCNC: 12.9 G/DL
IMM GRANULOCYTES NFR BLD AUTO: 0.5 %
LYMPHOCYTES # BLD AUTO: 1.65 K/UL
LYMPHOCYTES NFR BLD AUTO: 21.5 %
MAN DIFF?: NORMAL
MCHC RBC-ENTMCNC: 29.9 PG
MCHC RBC-ENTMCNC: 32.8 GM/DL
MCV RBC AUTO: 91.2 FL
MONOCYTES # BLD AUTO: 0.57 K/UL
MONOCYTES NFR BLD AUTO: 7.4 %
NEUTROPHILS # BLD AUTO: 5.2 K/UL
NEUTROPHILS NFR BLD AUTO: 67.7 %
PLATELET # BLD AUTO: 215 K/UL
POTASSIUM SERPL-SCNC: 4.5 MMOL/L
PROGEST SERPL-MCNC: 13.9 NG/ML
PROT SERPL-MCNC: 7 G/DL
RBC # BLD: 4.31 M/UL
RBC # FLD: 13.9 %
SODIUM SERPL-SCNC: 138 MMOL/L
WBC # FLD AUTO: 7.68 K/UL

## 2022-12-27 ENCOUNTER — APPOINTMENT (OUTPATIENT)
Dept: ULTRASOUND IMAGING | Facility: CLINIC | Age: 42
End: 2022-12-27

## 2022-12-27 ENCOUNTER — OUTPATIENT (OUTPATIENT)
Dept: OUTPATIENT SERVICES | Facility: HOSPITAL | Age: 42
LOS: 1 days | End: 2022-12-27
Payer: COMMERCIAL

## 2022-12-27 DIAGNOSIS — Z98.890 OTHER SPECIFIED POSTPROCEDURAL STATES: Chronic | ICD-10-CM

## 2022-12-27 DIAGNOSIS — Z34.90 ENCOUNTER FOR SUPERVISION OF NORMAL PREGNANCY, UNSPECIFIED, UNSPECIFIED TRIMESTER: ICD-10-CM

## 2022-12-27 PROCEDURE — 76817 TRANSVAGINAL US OBSTETRIC: CPT | Mod: 26

## 2022-12-27 PROCEDURE — 76817 TRANSVAGINAL US OBSTETRIC: CPT

## 2022-12-30 ENCOUNTER — APPOINTMENT (OUTPATIENT)
Dept: OBGYN | Facility: CLINIC | Age: 42
End: 2022-12-30
Payer: COMMERCIAL

## 2022-12-30 VITALS — DIASTOLIC BLOOD PRESSURE: 79 MMHG | SYSTOLIC BLOOD PRESSURE: 123 MMHG

## 2022-12-30 DIAGNOSIS — O03.9 COMPLETE OR UNSPECIFIED SPONTANEOUS ABORTION W/OUT COMPLICATION: ICD-10-CM

## 2022-12-30 PROCEDURE — 99213 OFFICE O/P EST LOW 20 MIN: CPT | Mod: 25

## 2022-12-30 PROCEDURE — 36415 COLL VENOUS BLD VENIPUNCTURE: CPT

## 2023-01-01 ENCOUNTER — NON-APPOINTMENT (OUTPATIENT)
Age: 43
End: 2023-01-01

## 2023-01-02 LAB
ALBUMIN SERPL ELPH-MCNC: 4.7 G/DL
ALP BLD-CCNC: 56 U/L
ALT SERPL-CCNC: 14 U/L
ANION GAP SERPL CALC-SCNC: 12 MMOL/L
AST SERPL-CCNC: 16 U/L
BASOPHILS # BLD AUTO: 0.04 K/UL
BASOPHILS NFR BLD AUTO: 0.5 %
BILIRUB SERPL-MCNC: 0.5 MG/DL
BUN SERPL-MCNC: 14 MG/DL
CALCIUM SERPL-MCNC: 9.4 MG/DL
CHLORIDE SERPL-SCNC: 101 MMOL/L
CO2 SERPL-SCNC: 24 MMOL/L
CREAT SERPL-MCNC: 0.9 MG/DL
EGFR: 82 ML/MIN/1.73M2
EOSINOPHIL # BLD AUTO: 0.24 K/UL
EOSINOPHIL NFR BLD AUTO: 2.8 %
GLUCOSE SERPL-MCNC: 89 MG/DL
HCG SERPL-MCNC: ABNORMAL MIU/ML
HCT VFR BLD CALC: 40.5 %
HGB BLD-MCNC: 13.2 G/DL
IMM GRANULOCYTES NFR BLD AUTO: 0.2 %
LYMPHOCYTES # BLD AUTO: 2.13 K/UL
LYMPHOCYTES NFR BLD AUTO: 25 %
MAN DIFF?: NORMAL
MCHC RBC-ENTMCNC: 30.3 PG
MCHC RBC-ENTMCNC: 32.6 GM/DL
MCV RBC AUTO: 93.1 FL
MONOCYTES # BLD AUTO: 0.74 K/UL
MONOCYTES NFR BLD AUTO: 8.7 %
NEUTROPHILS # BLD AUTO: 5.36 K/UL
NEUTROPHILS NFR BLD AUTO: 62.8 %
PLATELET # BLD AUTO: 237 K/UL
POTASSIUM SERPL-SCNC: 4.3 MMOL/L
PROT SERPL-MCNC: 7.2 G/DL
RBC # BLD: 4.35 M/UL
RBC # FLD: 13.5 %
SODIUM SERPL-SCNC: 136 MMOL/L
WBC # FLD AUTO: 8.53 K/UL

## 2023-01-05 ENCOUNTER — APPOINTMENT (OUTPATIENT)
Dept: OBGYN | Facility: CLINIC | Age: 43
End: 2023-01-05
Payer: COMMERCIAL

## 2023-01-05 ENCOUNTER — ASOB RESULT (OUTPATIENT)
Age: 43
End: 2023-01-05

## 2023-01-05 VITALS — SYSTOLIC BLOOD PRESSURE: 166 MMHG | DIASTOLIC BLOOD PRESSURE: 103 MMHG

## 2023-01-05 VITALS — SYSTOLIC BLOOD PRESSURE: 142 MMHG | DIASTOLIC BLOOD PRESSURE: 92 MMHG

## 2023-01-05 DIAGNOSIS — O02.1 MISSED ABORTION: ICD-10-CM

## 2023-01-05 PROCEDURE — 36415 COLL VENOUS BLD VENIPUNCTURE: CPT

## 2023-01-05 PROCEDURE — 99213 OFFICE O/P EST LOW 20 MIN: CPT | Mod: 25

## 2023-01-06 ENCOUNTER — APPOINTMENT (OUTPATIENT)
Dept: OBGYN | Facility: CLINIC | Age: 43
End: 2023-01-06

## 2023-01-09 LAB — HCG SERPL-MCNC: 1031 MIU/ML

## 2023-01-12 ENCOUNTER — APPOINTMENT (OUTPATIENT)
Dept: OBGYN | Facility: CLINIC | Age: 43
End: 2023-01-12

## 2023-01-16 LAB
HCG ESOTERIX: 1298.2 MIU/ML
HCG SERPL-MCNC: 46 MIU/ML

## 2023-01-31 LAB — HCG SERPL-MCNC: 3 MIU/ML

## 2023-02-01 ENCOUNTER — NON-APPOINTMENT (OUTPATIENT)
Age: 43
End: 2023-02-01

## 2023-03-01 ENCOUNTER — APPOINTMENT (OUTPATIENT)
Dept: OBGYN | Facility: CLINIC | Age: 43
End: 2023-03-01

## 2023-04-04 ENCOUNTER — APPOINTMENT (OUTPATIENT)
Dept: CARDIOLOGY | Facility: CLINIC | Age: 43
End: 2023-04-04
Payer: COMMERCIAL

## 2023-04-04 ENCOUNTER — NON-APPOINTMENT (OUTPATIENT)
Age: 43
End: 2023-04-04

## 2023-04-04 VITALS
HEIGHT: 62 IN | WEIGHT: 170 LBS | BODY MASS INDEX: 31.28 KG/M2 | HEART RATE: 73 BPM | SYSTOLIC BLOOD PRESSURE: 129 MMHG | DIASTOLIC BLOOD PRESSURE: 88 MMHG | OXYGEN SATURATION: 97 %

## 2023-04-04 DIAGNOSIS — E03.9 HYPOTHYROIDISM, UNSPECIFIED: ICD-10-CM

## 2023-04-04 DIAGNOSIS — I10 ESSENTIAL (PRIMARY) HYPERTENSION: ICD-10-CM

## 2023-04-04 DIAGNOSIS — R07.9 CHEST PAIN, UNSPECIFIED: ICD-10-CM

## 2023-04-04 PROCEDURE — 93000 ELECTROCARDIOGRAM COMPLETE: CPT

## 2023-04-04 PROCEDURE — 99213 OFFICE O/P EST LOW 20 MIN: CPT | Mod: 25

## 2023-04-04 RX ORDER — LEVOTHYROXINE SODIUM 0.07 MG/1
75 TABLET ORAL
Qty: 90 | Refills: 1 | Status: ACTIVE | COMMUNITY
Start: 2021-05-10

## 2023-04-04 RX ORDER — ALPRAZOLAM 0.25 MG/1
0.25 TABLET ORAL
Refills: 0 | Status: DISCONTINUED | COMMUNITY
Start: 2021-09-09 | End: 2023-04-04

## 2023-04-04 RX ORDER — CETIRIZINE HCL 10 MG
10 TABLET ORAL
Refills: 0 | Status: ACTIVE | COMMUNITY

## 2023-04-04 RX ORDER — SERTRALINE HYDROCHLORIDE 50 MG/1
50 TABLET, FILM COATED ORAL DAILY
Qty: 90 | Refills: 1 | Status: DISCONTINUED | COMMUNITY
Start: 2021-09-10 | End: 2023-04-04

## 2023-04-04 RX ORDER — LABETALOL HYDROCHLORIDE 200 MG/1
200 TABLET, FILM COATED ORAL
Qty: 60 | Refills: 0 | Status: DISCONTINUED | COMMUNITY
Start: 2021-09-09 | End: 2023-04-04

## 2023-04-04 RX ORDER — BUDESONIDE 90 UG/1
90 AEROSOL, POWDER RESPIRATORY (INHALATION)
Refills: 0 | Status: DISCONTINUED | COMMUNITY
Start: 2021-09-09 | End: 2023-04-04

## 2023-04-04 RX ORDER — SERTRALINE HYDROCHLORIDE 50 MG/1
50 TABLET, FILM COATED ORAL DAILY
Refills: 0 | Status: DISCONTINUED | COMMUNITY
End: 2023-04-04

## 2023-04-04 RX ORDER — NIFEDIPINE 30 MG/1
30 TABLET, EXTENDED RELEASE ORAL DAILY
Qty: 90 | Refills: 3 | Status: DISCONTINUED | COMMUNITY
Start: 2021-09-09 | End: 2023-04-04

## 2023-04-04 RX ORDER — MISOPROSTOL 200 UG/1
200 TABLET ORAL
Qty: 4 | Refills: 0 | Status: DISCONTINUED | COMMUNITY
Start: 2022-12-30 | End: 2023-04-04

## 2023-04-05 PROBLEM — E03.9 HYPOTHYROIDISM: Status: ACTIVE | Noted: 2021-09-09

## 2023-04-05 PROBLEM — R07.9 CHEST PAIN: Status: ACTIVE | Noted: 2021-09-09

## 2023-04-05 PROBLEM — I10 HYPERTENSION: Status: ACTIVE | Noted: 2021-10-19

## 2023-04-05 NOTE — DISCUSSION/SUMMARY
[FreeTextEntry1] : The patient is a 43-year-old female polycystic kidney disease, HTN with intermittent blood pressure control who is pursuing pregnancy again.\par #1 CV- normal EKG, echo and exercise stress test ordered\par #2 HTN- c/w labetalol for now, unable to tolerate nifedipine, best control was on lisinopril\par #3 THyroid- c/w levothyroxine\par #4 Renal-follows regularly for polycystic kidney disease\par #5 Ob- preeclampsia with first pregnancy now chronic hypertension\par  [EKG obtained to assist in diagnosis and management of assessed problem(s)] : EKG obtained to assist in diagnosis and management of assessed problem(s)

## 2023-04-05 NOTE — HISTORY OF PRESENT ILLNESS
[FreeTextEntry1] : Ann is a 43-year-old female \par 2-3 years ago first pregnancy at 32 weeks developed htn, sent her to hospital, started on labetalol and sent home but came back with elevated BP at 33 weeks and delivered. Went home on labetalol for a while. Then off meds for a while but BP increased. Started on NIfedipine but caused headache. Renal for polycystic kidneys put her on lisinopril which helped. Went to Dr. Marquez and was not feeling well. She was switched to labetalol but miscarriage and put back on lisinopril for a short time. NOw on labetalol again and trying to get pregnant. \par Also having palpitations at times. Stress and lack of sleep affect her BP.

## 2023-05-10 ENCOUNTER — APPOINTMENT (OUTPATIENT)
Dept: CARDIOLOGY | Facility: CLINIC | Age: 43
End: 2023-05-10
Payer: COMMERCIAL

## 2023-05-10 ENCOUNTER — APPOINTMENT (OUTPATIENT)
Dept: CV DIAGNOSTICS | Facility: HOSPITAL | Age: 43
End: 2023-05-10

## 2023-05-10 ENCOUNTER — OUTPATIENT (OUTPATIENT)
Dept: OUTPATIENT SERVICES | Facility: HOSPITAL | Age: 43
LOS: 1 days | End: 2023-05-10
Payer: COMMERCIAL

## 2023-05-10 DIAGNOSIS — I25.10 ATHEROSCLEROTIC HEART DISEASE OF NATIVE CORONARY ARTERY WITHOUT ANGINA PECTORIS: ICD-10-CM

## 2023-05-10 DIAGNOSIS — Z98.890 OTHER SPECIFIED POSTPROCEDURAL STATES: Chronic | ICD-10-CM

## 2023-05-10 PROCEDURE — 93018 CV STRESS TEST I&R ONLY: CPT

## 2023-05-10 PROCEDURE — 93017 CV STRESS TEST TRACING ONLY: CPT

## 2023-05-10 PROCEDURE — 93306 TTE W/DOPPLER COMPLETE: CPT

## 2023-05-10 PROCEDURE — 93016 CV STRESS TEST SUPVJ ONLY: CPT

## 2023-05-12 ENCOUNTER — APPOINTMENT (OUTPATIENT)
Dept: MAMMOGRAPHY | Facility: CLINIC | Age: 43
End: 2023-05-12
Payer: COMMERCIAL

## 2023-05-12 ENCOUNTER — OUTPATIENT (OUTPATIENT)
Dept: OUTPATIENT SERVICES | Facility: HOSPITAL | Age: 43
LOS: 1 days | End: 2023-05-12
Payer: COMMERCIAL

## 2023-05-12 ENCOUNTER — APPOINTMENT (OUTPATIENT)
Dept: ULTRASOUND IMAGING | Facility: CLINIC | Age: 43
End: 2023-05-12
Payer: COMMERCIAL

## 2023-05-12 DIAGNOSIS — Z00.8 ENCOUNTER FOR OTHER GENERAL EXAMINATION: ICD-10-CM

## 2023-05-12 DIAGNOSIS — Z98.890 OTHER SPECIFIED POSTPROCEDURAL STATES: Chronic | ICD-10-CM

## 2023-05-12 PROCEDURE — 77067 SCR MAMMO BI INCL CAD: CPT | Mod: 26

## 2023-05-12 PROCEDURE — 76641 ULTRASOUND BREAST COMPLETE: CPT

## 2023-05-12 PROCEDURE — 77063 BREAST TOMOSYNTHESIS BI: CPT | Mod: 26

## 2023-05-12 PROCEDURE — 77063 BREAST TOMOSYNTHESIS BI: CPT

## 2023-05-12 PROCEDURE — 76641 ULTRASOUND BREAST COMPLETE: CPT | Mod: 26,50

## 2023-05-12 PROCEDURE — 77067 SCR MAMMO BI INCL CAD: CPT

## 2023-05-12 RX ORDER — LABETALOL HYDROCHLORIDE 100 MG/1
100 TABLET, FILM COATED ORAL TWICE DAILY
Qty: 270 | Refills: 1 | Status: ACTIVE | COMMUNITY
Start: 2021-10-19 | End: 1900-01-01

## 2023-06-07 ENCOUNTER — NON-APPOINTMENT (OUTPATIENT)
Age: 43
End: 2023-06-07

## 2023-12-01 ENCOUNTER — OUTPATIENT (OUTPATIENT)
Dept: OUTPATIENT SERVICES | Facility: HOSPITAL | Age: 43
LOS: 1 days | End: 2023-12-01
Payer: COMMERCIAL

## 2023-12-01 ENCOUNTER — APPOINTMENT (OUTPATIENT)
Dept: MRI IMAGING | Facility: CLINIC | Age: 43
End: 2023-12-01
Payer: COMMERCIAL

## 2023-12-01 DIAGNOSIS — Z80.3 FAMILY HISTORY OF MALIGNANT NEOPLASM OF BREAST: ICD-10-CM

## 2023-12-01 DIAGNOSIS — Z98.890 OTHER SPECIFIED POSTPROCEDURAL STATES: Chronic | ICD-10-CM

## 2023-12-01 DIAGNOSIS — D24.2 BENIGN NEOPLASM OF LEFT BREAST: ICD-10-CM

## 2023-12-01 PROCEDURE — A9585: CPT

## 2023-12-01 PROCEDURE — C8908: CPT

## 2023-12-01 PROCEDURE — 77049 MRI BREAST C-+ W/CAD BI: CPT | Mod: 26

## 2023-12-01 PROCEDURE — C8937: CPT

## 2024-06-01 ENCOUNTER — NON-APPOINTMENT (OUTPATIENT)
Age: 44
End: 2024-06-01

## 2024-10-11 ENCOUNTER — APPOINTMENT (OUTPATIENT)
Dept: HUMAN REPRODUCTION | Facility: CLINIC | Age: 44
End: 2024-10-11
Payer: COMMERCIAL

## 2024-10-11 PROCEDURE — 99205 OFFICE O/P NEW HI 60 MIN: CPT | Mod: NC

## 2024-10-11 PROCEDURE — 76830 TRANSVAGINAL US NON-OB: CPT | Mod: NC

## 2024-10-11 PROCEDURE — 36415 COLL VENOUS BLD VENIPUNCTURE: CPT | Mod: NC

## 2024-10-11 PROCEDURE — 99499PP: CUSTOM

## 2024-11-05 ENCOUNTER — APPOINTMENT (OUTPATIENT)
Dept: HUMAN REPRODUCTION | Facility: CLINIC | Age: 44
End: 2024-11-05
Payer: COMMERCIAL

## 2024-11-05 PROCEDURE — 99215 OFFICE O/P EST HI 40 MIN: CPT | Mod: NC

## 2024-11-21 ENCOUNTER — APPOINTMENT (OUTPATIENT)
Dept: MATERNAL FETAL MEDICINE | Facility: CLINIC | Age: 44
End: 2024-11-21

## 2024-11-21 DIAGNOSIS — O14.13 SEVERE PRE-ECLAMPSIA, THIRD TRIMESTER: ICD-10-CM

## 2024-11-21 DIAGNOSIS — O36.5990 MATERNAL CARE FOR OTHER KNOWN OR SUSPECTED POOR FETAL GROWTH, UNSPECIFIED TRIMESTER, NOT APPLICABLE OR UNSPECIFIED: ICD-10-CM

## 2024-11-21 PROCEDURE — 99205 OFFICE O/P NEW HI 60 MIN: CPT | Mod: 95

## 2024-11-26 ENCOUNTER — APPOINTMENT (OUTPATIENT)
Dept: OBGYN | Facility: CLINIC | Age: 44
End: 2024-11-26
Payer: COMMERCIAL

## 2024-11-26 ENCOUNTER — NON-APPOINTMENT (OUTPATIENT)
Age: 44
End: 2024-11-26

## 2024-11-26 VITALS
BODY MASS INDEX: 30.36 KG/M2 | HEIGHT: 62 IN | WEIGHT: 165 LBS | DIASTOLIC BLOOD PRESSURE: 82 MMHG | SYSTOLIC BLOOD PRESSURE: 123 MMHG

## 2024-11-26 DIAGNOSIS — Z13.31 ENCOUNTER FOR SCREENING FOR DEPRESSION: ICD-10-CM

## 2024-11-26 DIAGNOSIS — Z31.69 ENCOUNTER FOR OTHER GENERAL COUNSELING AND ADVICE ON PROCREATION: ICD-10-CM

## 2024-11-26 DIAGNOSIS — Q61.3 POLYCYSTIC KIDNEY, UNSPECIFIED: ICD-10-CM

## 2024-11-26 DIAGNOSIS — Z98.890 OTHER SPECIFIED POSTPROCEDURAL STATES: ICD-10-CM

## 2024-11-26 DIAGNOSIS — D21.9 BENIGN NEOPLASM OF CONNECTIVE AND OTHER SOFT TISSUE, UNSPECIFIED: ICD-10-CM

## 2024-11-26 DIAGNOSIS — Z01.419 ENCOUNTER FOR GYNECOLOGICAL EXAMINATION (GENERAL) (ROUTINE) W/OUT ABNORMAL FINDINGS: ICD-10-CM

## 2024-11-26 PROCEDURE — 99459 PELVIC EXAMINATION: CPT

## 2024-11-26 PROCEDURE — G0444 DEPRESSION SCREEN ANNUAL: CPT | Mod: 59

## 2024-11-26 PROCEDURE — 99396 PREV VISIT EST AGE 40-64: CPT

## 2024-11-26 PROCEDURE — 82270 OCCULT BLOOD FECES: CPT

## 2024-11-28 LAB — HPV HIGH+LOW RISK DNA PNL CVX: NOT DETECTED

## 2024-12-03 LAB — CYTOLOGY CVX/VAG DOC THIN PREP: NORMAL

## 2025-01-03 ENCOUNTER — APPOINTMENT (OUTPATIENT)
Dept: OBGYN | Facility: CLINIC | Age: 45
End: 2025-01-03
Payer: COMMERCIAL

## 2025-01-03 ENCOUNTER — ASOB RESULT (OUTPATIENT)
Age: 45
End: 2025-01-03

## 2025-01-03 PROCEDURE — 76830 TRANSVAGINAL US NON-OB: CPT

## 2025-01-14 ENCOUNTER — NON-APPOINTMENT (OUTPATIENT)
Age: 45
End: 2025-01-14

## 2025-01-28 ENCOUNTER — APPOINTMENT (OUTPATIENT)
Dept: CARDIOLOGY | Facility: CLINIC | Age: 45
End: 2025-01-28

## 2025-02-04 ENCOUNTER — NON-APPOINTMENT (OUTPATIENT)
Age: 45
End: 2025-02-04

## 2025-02-04 ENCOUNTER — APPOINTMENT (OUTPATIENT)
Dept: CARDIOLOGY | Facility: CLINIC | Age: 45
End: 2025-02-04
Payer: COMMERCIAL

## 2025-02-04 VITALS
WEIGHT: 170 LBS | DIASTOLIC BLOOD PRESSURE: 85 MMHG | BODY MASS INDEX: 39.34 KG/M2 | SYSTOLIC BLOOD PRESSURE: 129 MMHG | HEART RATE: 76 BPM | OXYGEN SATURATION: 98 % | HEIGHT: 55 IN

## 2025-02-04 DIAGNOSIS — I10 ESSENTIAL (PRIMARY) HYPERTENSION: ICD-10-CM

## 2025-02-04 PROCEDURE — G2211 COMPLEX E/M VISIT ADD ON: CPT | Mod: NC

## 2025-02-04 PROCEDURE — 93000 ELECTROCARDIOGRAM COMPLETE: CPT

## 2025-02-04 PROCEDURE — 99214 OFFICE O/P EST MOD 30 MIN: CPT

## 2025-04-30 ENCOUNTER — NON-APPOINTMENT (OUTPATIENT)
Age: 45
End: 2025-04-30

## 2025-08-18 ENCOUNTER — NON-APPOINTMENT (OUTPATIENT)
Age: 45
End: 2025-08-18

## 2025-08-19 ENCOUNTER — APPOINTMENT (OUTPATIENT)
Dept: NEUROLOGY | Facility: CLINIC | Age: 45
End: 2025-08-19
Payer: COMMERCIAL

## 2025-08-19 VITALS
SYSTOLIC BLOOD PRESSURE: 130 MMHG | DIASTOLIC BLOOD PRESSURE: 92 MMHG | HEIGHT: 62 IN | WEIGHT: 135 LBS | HEART RATE: 71 BPM | TEMPERATURE: 98.2 F | BODY MASS INDEX: 24.84 KG/M2

## 2025-08-19 DIAGNOSIS — R06.83 SNORING: ICD-10-CM

## 2025-08-19 PROCEDURE — 99204 OFFICE O/P NEW MOD 45 MIN: CPT

## 2025-08-19 RX ORDER — ARMODAFINIL 150 MG/1
150 TABLET ORAL
Qty: 30 | Refills: 3 | Status: ACTIVE | COMMUNITY
Start: 2025-08-19 | End: 1900-01-01

## 2025-08-19 RX ORDER — TIRZEPATIDE 10 MG/.5ML
10 INJECTION, SOLUTION SUBCUTANEOUS
Refills: 0 | Status: ACTIVE | COMMUNITY

## 2025-08-19 RX ORDER — VERAPAMIL HYDROCHLORIDE 80 MG/1
TABLET ORAL
Refills: 0 | Status: ACTIVE | COMMUNITY

## 2025-08-25 ENCOUNTER — APPOINTMENT (OUTPATIENT)
Dept: NEUROLOGY | Facility: CLINIC | Age: 45
End: 2025-08-25
Payer: COMMERCIAL

## 2025-08-25 DIAGNOSIS — G47.19 OTHER HYPERSOMNIA: ICD-10-CM

## 2025-08-25 PROCEDURE — 95806 SLEEP STUDY UNATT&RESP EFFT: CPT

## 2025-08-26 PROBLEM — G47.19 EXCESSIVE DAYTIME SLEEPINESS: Status: ACTIVE | Noted: 2025-08-19
